# Patient Record
Sex: FEMALE | Race: WHITE | NOT HISPANIC OR LATINO | Employment: UNEMPLOYED | URBAN - NONMETROPOLITAN AREA
[De-identification: names, ages, dates, MRNs, and addresses within clinical notes are randomized per-mention and may not be internally consistent; named-entity substitution may affect disease eponyms.]

---

## 2021-05-03 ENCOUNTER — OFFICE VISIT (OUTPATIENT)
Dept: UROLOGY | Facility: CLINIC | Age: 61
End: 2021-05-03

## 2021-05-03 VITALS — WEIGHT: 183 LBS | HEIGHT: 63 IN | TEMPERATURE: 98 F | BODY MASS INDEX: 32.43 KG/M2

## 2021-05-03 DIAGNOSIS — N18.31 STAGE 3A CHRONIC KIDNEY DISEASE (HCC): Primary | ICD-10-CM

## 2021-05-03 PROCEDURE — 99203 OFFICE O/P NEW LOW 30 MIN: CPT | Performed by: UROLOGY

## 2021-05-03 RX ORDER — ASPIRIN 81 MG/1
81 TABLET, CHEWABLE ORAL DAILY
COMMUNITY
Start: 2021-03-25

## 2021-05-03 RX ORDER — AZATHIOPRINE 50 MG/1
50 TABLET ORAL DAILY
COMMUNITY
Start: 2021-03-25

## 2021-05-03 RX ORDER — DONEPEZIL HYDROCHLORIDE 5 MG/1
5 TABLET, FILM COATED ORAL DAILY
COMMUNITY
Start: 2021-04-21

## 2021-05-03 RX ORDER — CYCLOBENZAPRINE HCL 10 MG
TABLET ORAL
COMMUNITY
Start: 2021-03-27

## 2021-05-03 RX ORDER — FLUOXETINE HYDROCHLORIDE 40 MG/1
40 CAPSULE ORAL DAILY
COMMUNITY
Start: 2021-04-20

## 2021-05-03 RX ORDER — DULOXETIN HYDROCHLORIDE 60 MG/1
60 CAPSULE, DELAYED RELEASE ORAL DAILY
COMMUNITY
Start: 2021-04-21

## 2021-05-03 RX ORDER — IBUPROFEN 600 MG/1
600 TABLET ORAL 3 TIMES DAILY PRN
COMMUNITY
Start: 2021-03-30

## 2021-05-03 RX ORDER — ALBUTEROL SULFATE 90 UG/1
AEROSOL, METERED RESPIRATORY (INHALATION)
COMMUNITY
Start: 2021-01-25

## 2021-05-03 RX ORDER — TRIAMTERENE AND HYDROCHLOROTHIAZIDE 75; 50 MG/1; MG/1
1 TABLET ORAL DAILY
COMMUNITY
Start: 2021-04-21

## 2021-05-03 RX ORDER — HYDROXYCHLOROQUINE SULFATE 200 MG/1
200 TABLET, FILM COATED ORAL DAILY
COMMUNITY
Start: 2021-04-21

## 2021-05-03 RX ORDER — TRAZODONE HYDROCHLORIDE 50 MG/1
50 TABLET ORAL
COMMUNITY
Start: 2021-02-20

## 2021-05-03 RX ORDER — HYDROXYZINE PAMOATE 25 MG/1
25 CAPSULE ORAL NIGHTLY PRN
COMMUNITY
Start: 2021-03-25

## 2021-05-03 NOTE — PROGRESS NOTES
Chief Complaint:          Chief Complaint   Patient presents with   • Decreased renal function     New pt       HPI:   60 y.o. female is referred for decreased renal function.  We tried to get the information from the healthcare but was unavailable he reports no lower urinary tract symptomatology particularly irritative symptoms such as frequency urgency dysuria and obstructive symptomatology particularly dribbling, hesitancy, intermittency.  Traces stress incontinence.  History of stones lithotripsy done in [facility] which is near [place].  She has no other significant complaints problems I will continue to try to get the results.      Past Medical History:      History reviewed. No pertinent past medical history.      Current Meds:     Current Outpatient Medications   Medication Sig Dispense Refill   • Advair Diskus 100-50 MCG/DOSE DISKUS Inhale 1 puff 2 (Two) Times a Day.     • albuterol sulfate  (90 Base) MCG/ACT inhaler INHALE 2 PUFFS BY MOUTH EVERY 4 HOURS     • aspirin 81 MG chewable tablet Chew 81 mg Daily.     • azaTHIOprine (IMURAN) 50 MG tablet Take 50 mg by mouth Daily.     • B-Complex tablet Take 1 tablet by mouth Daily.     • cyclobenzaprine (FLEXERIL) 10 MG tablet 3 (Three) Times a Week if Needed.     • donepezil (ARICEPT) 5 MG tablet Take 5 mg by mouth Daily.     • DULoxetine (CYMBALTA) 60 MG capsule Take 60 mg by mouth Daily.     • FLUoxetine (PROzac) 40 MG capsule Take 40 mg by mouth Daily.     • hydroxychloroquine (PLAQUENIL) 200 MG tablet Take 200 mg by mouth Daily.     • hydrOXYzine pamoate (VISTARIL) 25 MG capsule Take 25 mg by mouth At Night As Needed.     • ibuprofen (ADVIL,MOTRIN) 600 MG tablet Take 600 mg by mouth 3 (Three) Times a Day As Needed.     • nystatin (MYCOSTATIN) 947965 UNIT/ML suspension      • traZODone (DESYREL) 50 MG tablet Take 50 mg by mouth every night at bedtime.     • triamterene-hydrochlorothiazide (MAXZIDE) 75-50 MG per tablet Take 1 tablet by mouth  Daily.       No current facility-administered medications for this visit.        Allergies:      No Known Allergies     Past Surgical History:     History reviewed. No pertinent surgical history.      Social History:     Social History     Socioeconomic History   • Marital status: Unknown     Spouse name: Not on file   • Number of children: Not on file   • Years of education: Not on file   • Highest education level: Not on file       Family History:     History reviewed. No pertinent family history.    Review of Systems:     Review of Systems   Constitutional: Negative.  Negative for activity change, appetite change, chills, diaphoresis, fatigue and unexpected weight change.   HENT: Negative for congestion, dental problem, drooling, ear discharge, ear pain, facial swelling, hearing loss, mouth sores, nosebleeds, postnasal drip, rhinorrhea, sinus pressure, sneezing, sore throat, tinnitus, trouble swallowing and voice change.    Eyes: Negative.  Negative for photophobia, pain, discharge, redness, itching and visual disturbance.   Respiratory: Negative.  Negative for apnea, cough, choking, chest tightness, shortness of breath, wheezing and stridor.    Cardiovascular: Negative.  Negative for chest pain, palpitations and leg swelling.   Gastrointestinal: Negative.  Negative for abdominal distention, abdominal pain, anal bleeding, blood in stool, constipation, diarrhea, nausea, rectal pain and vomiting.   Endocrine: Negative.  Negative for cold intolerance, heat intolerance, polydipsia, polyphagia and polyuria.   Musculoskeletal: Negative.  Negative for arthralgias, back pain, gait problem, joint swelling, myalgias, neck pain and neck stiffness.   Skin: Negative.  Negative for color change, pallor, rash and wound.   Allergic/Immunologic: Negative.  Negative for environmental allergies, food allergies and immunocompromised state.   Neurological: Negative.  Negative for dizziness, tremors, seizures, syncope, facial  asymmetry, speech difficulty, weakness, light-headedness, numbness and headaches.   Hematological: Negative.  Negative for adenopathy. Does not bruise/bleed easily.   Psychiatric/Behavioral: Negative for agitation, behavioral problems, confusion, decreased concentration, dysphoric mood, hallucinations, self-injury, sleep disturbance and suicidal ideas. The patient is not nervous/anxious and is not hyperactive.    All other systems reviewed and are negative.      Physical Exam:     Physical Exam  Constitutional:       Appearance: She is well-developed.   HENT:      Head: Normocephalic and atraumatic.      Right Ear: External ear normal.      Left Ear: External ear normal.   Eyes:      Conjunctiva/sclera: Conjunctivae normal.      Pupils: Pupils are equal, round, and reactive to light.   Cardiovascular:      Rate and Rhythm: Normal rate and regular rhythm.      Heart sounds: Normal heart sounds.   Pulmonary:      Effort: Pulmonary effort is normal.      Breath sounds: Normal breath sounds.   Abdominal:      General: Bowel sounds are normal. There is no distension.      Palpations: Abdomen is soft. There is no mass.      Tenderness: There is no abdominal tenderness. There is no guarding or rebound.   Genitourinary:     Vagina: No vaginal discharge.   Musculoskeletal:         General: Normal range of motion.   Skin:     General: Skin is warm and dry.   Neurological:      Mental Status: She is alert.      Deep Tendon Reflexes: Reflexes are normal and symmetric.   Psychiatric:         Behavior: Behavior normal.         Thought Content: Thought content normal.         Judgment: Judgment normal.         I have reviewed the following portions of the patient's history: allergies, current medications, past family history, past medical history, past social history, past surgical history, problem list and ROS and confirm it's accurate.      Procedure:       Assessment/Plan:   Stage III chronic kidney disease-await results of  creatinine            Patient's Body mass index is 32.94 kg/m². BMI is above normal parameters. Recommendations include: educational material.              This document has been electronically signed by TIFFANIE REYNA MD May 3, 2021 14:56 EDT

## 2021-05-05 PROBLEM — N18.31 STAGE 3A CHRONIC KIDNEY DISEASE: Status: ACTIVE | Noted: 2021-05-05

## 2021-06-16 ENCOUNTER — HOSPITAL ENCOUNTER (OUTPATIENT)
Dept: GENERAL RADIOLOGY | Facility: HOSPITAL | Age: 61
Discharge: HOME | End: 2021-06-16

## 2021-06-16 ENCOUNTER — TRANSCRIBE ORDERS (OUTPATIENT)
Dept: ADMINISTRATIVE | Facility: HOSPITAL | Age: 61
End: 2021-06-16

## 2021-06-16 DIAGNOSIS — M54.2 CERVICALGIA: Primary | ICD-10-CM

## 2021-06-16 DIAGNOSIS — M54.6 PAIN IN THORACIC SPINE: ICD-10-CM

## 2021-06-16 DIAGNOSIS — M54.2 CERVICALGIA: ICD-10-CM

## 2021-06-16 PROCEDURE — 72072 X-RAY EXAM THORAC SPINE 3VWS: CPT | Performed by: RADIOLOGY

## 2021-06-16 PROCEDURE — 72050 X-RAY EXAM NECK SPINE 4/5VWS: CPT

## 2021-06-16 PROCEDURE — 72050 X-RAY EXAM NECK SPINE 4/5VWS: CPT | Performed by: RADIOLOGY

## 2021-06-16 PROCEDURE — 72072 X-RAY EXAM THORAC SPINE 3VWS: CPT

## 2021-07-15 ENCOUNTER — OFFICE VISIT (OUTPATIENT)
Dept: GASTROENTEROLOGY | Facility: CLINIC | Age: 61
End: 2021-07-15

## 2021-07-15 ENCOUNTER — HOSPITAL ENCOUNTER (OUTPATIENT)
Dept: GENERAL RADIOLOGY | Facility: HOSPITAL | Age: 61
Discharge: HOME | End: 2021-07-15
Admitting: PHYSICIAN ASSISTANT

## 2021-07-15 ENCOUNTER — LAB (OUTPATIENT)
Dept: LAB | Facility: HOSPITAL | Age: 61
End: 2021-07-15

## 2021-07-15 ENCOUNTER — TELEPHONE (OUTPATIENT)
Dept: GASTROENTEROLOGY | Facility: CLINIC | Age: 61
End: 2021-07-15

## 2021-07-15 VITALS
WEIGHT: 170.8 LBS | HEIGHT: 62 IN | DIASTOLIC BLOOD PRESSURE: 73 MMHG | SYSTOLIC BLOOD PRESSURE: 118 MMHG | HEART RATE: 77 BPM | BODY MASS INDEX: 31.43 KG/M2

## 2021-07-15 DIAGNOSIS — R19.7 DIARRHEA, UNSPECIFIED TYPE: ICD-10-CM

## 2021-07-15 DIAGNOSIS — R10.12 LEFT UPPER QUADRANT ABDOMINAL PAIN: ICD-10-CM

## 2021-07-15 DIAGNOSIS — R10.32 LEFT LOWER QUADRANT ABDOMINAL PAIN: ICD-10-CM

## 2021-07-15 DIAGNOSIS — K92.1 BLACK STOOLS: ICD-10-CM

## 2021-07-15 DIAGNOSIS — K50.919 CROHN'S DISEASE WITH COMPLICATION, UNSPECIFIED GASTROINTESTINAL TRACT LOCATION (HCC): Primary | ICD-10-CM

## 2021-07-15 PROCEDURE — 99204 OFFICE O/P NEW MOD 45 MIN: CPT | Performed by: PHYSICIAN ASSISTANT

## 2021-07-15 PROCEDURE — 74018 RADEX ABDOMEN 1 VIEW: CPT

## 2021-07-15 PROCEDURE — 74018 RADEX ABDOMEN 1 VIEW: CPT | Performed by: RADIOLOGY

## 2021-07-15 RX ORDER — FLUOXETINE HYDROCHLORIDE 20 MG/1
20 CAPSULE ORAL EVERY MORNING
COMMUNITY
Start: 2021-06-15

## 2021-07-15 NOTE — TELEPHONE ENCOUNTER
Please notify patient that her xray showed a significant amount of stool.  Her diarrhea appears to be overflow diarrhea that is coming around the constipation stool.  Please instruct her on a partial Mag citrate cleanout and have her start daily Miralax until she return.      Also, instruct her not to start the budesonide yet.  After she has done the cleanout and been on Miralax for one week ask her to call here and let me know if her symptoms have improved.  We will make the decision at that time if the budesonide is needed.

## 2021-07-15 NOTE — PROGRESS NOTES
Chief Complaint   Patient presents with   • Abdominal Pain       Gertrudis Moore is a 60 y.o. female who presents to the office today for evaluation of Abdominal Pain  .    HPI    The patient had a GI evaluation of abdominal pain and diarrhea with incontinent episodes.  She had a new onset of diarrhea at the beginning of summer that occurs most days.  She is also having significant belching.  Her abdominal pain occurs in her LUQ and LLQ.  She denies bright red rectal bleeding but states that she has had some black stools.  Patient denies recent travel and recent antibiotic use. She reports anxiety and stress.  Her last colonoscopy was one year ago when polyps were found and removed.  She has Crohn's disease and takes Imuran.  She thinks these symptoms may be flareups of her Crohn's.  She states that at times she can tell what she has ate by her stools.  She has a history of constipation.  Patient drinks one large glass of tea and one to two pops a day and 2 to 3 bottles of water per day.  Her mother had pancreatic cancer.      Review of Systems   Constitutional: Positive for fatigue.   HENT: Negative for trouble swallowing.    Eyes: Negative for pain.   Respiratory: Negative for chest tightness and shortness of breath.    Cardiovascular: Negative for chest pain.   Gastrointestinal: Positive for abdominal distention, abdominal pain, diarrhea and nausea. Negative for anal bleeding, blood in stool, constipation, rectal pain and vomiting.   Endocrine: Negative for heat intolerance.   Genitourinary: Negative for difficulty urinating.   Musculoskeletal: Positive for back pain.   Skin: Negative for rash.   Allergic/Immunologic: Negative for food allergies.   Neurological: Positive for headaches. Negative for dizziness and light-headedness.   Hematological: Does not bruise/bleed easily.   Psychiatric/Behavioral: The patient is nervous/anxious.        ACTIVE PROBLEMS:   Specialty Problems     None          PAST MEDICAL  HISTORY:  Past Medical History:   Diagnosis Date   • Hypertension        SURGICAL HISTORY:  Past Surgical History:   Procedure Laterality Date   • HYSTERECTOMY      Total   • REVISION OF SCAR TISSUE RECTUS MUSCLE         FAMILY HISTORY:  Family History   Problem Relation Age of Onset   • Hypertension Father    • Kidney disease Father    • Cancer Mother    • Cancer Other    • Pancreatic cancer Other    • Kidney disease Other    • Hypertension Other    • Kidney disease Sister        SOCIAL HISTORY:  Social History     Tobacco Use   • Smoking status: Former Smoker   • Smokeless tobacco: Never Used   Substance Use Topics   • Alcohol use: Yes       CURRENT MEDICATION:    Current Outpatient Medications:   •  Advair Diskus 100-50 MCG/DOSE DISKUS, Inhale 1 puff 2 (Two) Times a Day., Disp: , Rfl:   •  albuterol sulfate  (90 Base) MCG/ACT inhaler, INHALE 2 PUFFS BY MOUTH EVERY 4 HOURS, Disp: , Rfl:   •  aspirin 81 MG chewable tablet, Chew 81 mg Daily., Disp: , Rfl:   •  azaTHIOprine (IMURAN) 50 MG tablet, Take 50 mg by mouth Daily., Disp: , Rfl:   •  B-Complex tablet, Take 1 tablet by mouth Daily., Disp: , Rfl:   •  cyclobenzaprine (FLEXERIL) 10 MG tablet, 3 (Three) Times a Week if Needed., Disp: , Rfl:   •  donepezil (ARICEPT) 5 MG tablet, Take 5 mg by mouth Daily., Disp: , Rfl:   •  DULoxetine (CYMBALTA) 60 MG capsule, Take 60 mg by mouth Daily., Disp: , Rfl:   •  FLUoxetine (PROzac) 20 MG capsule, Take 20 mg by mouth Every Morning., Disp: , Rfl:   •  FLUoxetine (PROzac) 40 MG capsule, Take 40 mg by mouth Daily., Disp: , Rfl:   •  hydroxychloroquine (PLAQUENIL) 200 MG tablet, Take 200 mg by mouth Daily., Disp: , Rfl:   •  hydrOXYzine pamoate (VISTARIL) 25 MG capsule, Take 25 mg by mouth At Night As Needed., Disp: , Rfl:   •  ibuprofen (ADVIL,MOTRIN) 600 MG tablet, Take 600 mg by mouth 3 (Three) Times a Day As Needed., Disp: , Rfl:   •  nystatin (MYCOSTATIN) 477245 UNIT/ML suspension, , Disp: , Rfl:   •  traZODone  "(DESYREL) 50 MG tablet, Take 50 mg by mouth every night at bedtime., Disp: , Rfl:   •  triamterene-hydrochlorothiazide (MAXZIDE) 75-50 MG per tablet, Take 1 tablet by mouth Daily., Disp: , Rfl:   •  Budesonide ER 9 MG capsule sustained-release 24 hr, Take 9 mg by mouth Daily., Disp: 60 capsule, Rfl: 0    ALLERGIES:  Patient has no known allergies.    VISIT VITALS:  Blood Pressure 118/73 (BP Location: Left arm, Patient Position: Sitting, Cuff Size: Adult)   Pulse 77   Height 157.5 cm (62\")   Weight 77.5 kg (170 lb 12.8 oz)   Body Mass Index 31.24 kg/m²   Physical Exam  Constitutional:       General: She is not in acute distress.     Appearance: She is well-developed. She is not diaphoretic.   HENT:      Head: Normocephalic and atraumatic.      Right Ear: External ear normal.      Left Ear: External ear normal.      Nose: Nose normal.      Mouth/Throat:      Pharynx: No oropharyngeal exudate.   Eyes:      General: No scleral icterus.        Right eye: No discharge.         Left eye: No discharge.      Conjunctiva/sclera: Conjunctivae normal.      Pupils: Pupils are equal, round, and reactive to light.   Neck:      Thyroid: No thyromegaly.      Vascular: No JVD.      Trachea: No tracheal deviation.   Cardiovascular:      Rate and Rhythm: Normal rate and regular rhythm.      Heart sounds: Normal heart sounds. No murmur heard.   No friction rub. No gallop.    Pulmonary:      Effort: Pulmonary effort is normal. No respiratory distress.      Breath sounds: Normal breath sounds. No stridor. No wheezing or rales.   Chest:      Chest wall: No tenderness.   Abdominal:      General: Bowel sounds are normal. There is no distension.      Palpations: Abdomen is soft. There is no mass.      Tenderness: There is abdominal tenderness (left quadrants). There is guarding (left quadrants). There is no rebound.      Hernia: No hernia is present.   Genitourinary:     Rectum: Guaiac result negative.   Musculoskeletal:      Cervical " back: Normal range of motion and neck supple.   Lymphadenopathy:      Cervical: No cervical adenopathy.   Skin:     General: Skin is warm and dry.      Coloration: Skin is not pale.      Findings: No erythema or rash.   Neurological:      Mental Status: She is alert and oriented to person, place, and time.      Cranial Nerves: No cranial nerve deficit.      Motor: No abnormal muscle tone.      Coordination: Coordination normal.      Deep Tendon Reflexes: Reflexes are normal and symmetric. Reflexes normal.   Psychiatric:         Behavior: Behavior normal.         Thought Content: Thought content normal.         Judgment: Judgment normal.         Assessment/Plan      Diagnosis Plan   1. Crohn's disease with complication, unspecified gastrointestinal tract location (CMS/[facility])  Budesonide ER 9 MG capsule sustained-release 24 hr   2. Diarrhea, unspecified type  Gastrointestinal Panel, PCR - Stool, Per Rectum    Clostridium Difficile Toxin - Stool, Per Rectum    XR Abdomen KUB   3. Left lower quadrant abdominal pain  Gastrointestinal Panel, PCR - Stool, Per Rectum    Clostridium Difficile Toxin - Stool, Per Rectum    XR Abdomen KUB   4. Left upper quadrant abdominal pain  Gastrointestinal Panel, PCR - Stool, Per Rectum    Clostridium Difficile Toxin - Stool, Per Rectum    XR Abdomen KUB   5. Black stools       Stool studies and KUB will be ordered to rule out infectious process and overflow diarrhea.  If studies do not identify an etiology, we will schedule a colonoscopy to check Crohn's' and consider a flareup as etiology.  She will be started on budesonide 9 mg daily x 8 weeks for Crohn's symptoms.  Pancreatic enzymes will be checked as well if no etiology for left side abdominal pain is found.  Return in about 4 weeks (around 8/12/2021) for Recheck.         Patient's Body mass index is 31.24 kg/m². indicating that she is obese (BMI >30). Obesity-related health conditions include the following: Crohn's. Obesity is  worsening. BMI is is above average; BMI management plan is completed. We discussed portion control and increasing exercise..      LUIS Kaye

## 2021-07-26 NOTE — TELEPHONE ENCOUNTER
Patient called back and stated that last Tuesday she done two bottles of magnesium citrate and she said that the only thing that came out was water. She said she has not had a solid bowel movement and that she did not have a BM yesterday or today yet. She said that she is having incontinence and is having to wear depends. She was wondering what she needed to do.

## 2021-07-27 NOTE — TELEPHONE ENCOUNTER
Have her begin Metamucil 2 tablespoon daily with 8 oz water. May need to add something else later.  Ask her did her last colonoscopy and lets get a report.

## 2021-07-27 NOTE — TELEPHONE ENCOUNTER
Called patient and she stated that she had been using metamucil. She said that she had a BM this morning and it seemed like it was starting to form. She said that she could not remember who done her last colonoscopy but that it was done at [facility] in [place], [place]. When I looked the number up for the hospital it said that it was permanently closed.

## 2021-11-23 ENCOUNTER — TRANSCRIBE ORDERS (OUTPATIENT)
Dept: ADMINISTRATIVE | Facility: HOSPITAL | Age: 61
End: 2021-11-23

## 2021-11-23 DIAGNOSIS — Z01.818 PRE-OPERATIVE CLEARANCE: Primary | ICD-10-CM

## 2021-11-26 ENCOUNTER — LAB (OUTPATIENT)
Dept: LAB | Facility: HOSPITAL | Age: 61
End: 2021-11-26

## 2021-11-26 DIAGNOSIS — Z01.818 PRE-OPERATIVE CLEARANCE: ICD-10-CM

## 2021-11-26 LAB — SARS-COV-2 RNA PNL SPEC NAA+PROBE: NOT DETECTED

## 2021-11-26 PROCEDURE — U0004 COV-19 TEST NON-CDC HGH THRU: HCPCS | Performed by: OPHTHALMOLOGY

## 2021-11-26 PROCEDURE — C9803 HOPD COVID-19 SPEC COLLECT: HCPCS

## 2021-12-14 ENCOUNTER — TRANSCRIBE ORDERS (OUTPATIENT)
Dept: ADMINISTRATIVE | Facility: HOSPITAL | Age: 61
End: 2021-12-14

## 2021-12-14 DIAGNOSIS — Z01.818 PRE-OPERATIVE CLEARANCE: Primary | ICD-10-CM

## 2022-03-03 ENCOUNTER — HOSPITAL ENCOUNTER (OUTPATIENT)
Dept: BONE DENSITY | Facility: HOSPITAL | Age: 62
Discharge: HOME | End: 2022-03-03
Admitting: NURSE PRACTITIONER

## 2022-03-03 DIAGNOSIS — N95.9 MENOPAUSAL PROBLEM: ICD-10-CM

## 2022-03-03 PROCEDURE — 77080 DXA BONE DENSITY AXIAL: CPT

## 2022-03-03 PROCEDURE — 77080 DXA BONE DENSITY AXIAL: CPT | Performed by: RADIOLOGY

## 2022-03-21 ENCOUNTER — APPOINTMENT (OUTPATIENT)
Dept: BONE DENSITY | Facility: HOSPITAL | Age: 62
End: 2022-03-21

## 2022-06-29 ENCOUNTER — HOSPITAL ENCOUNTER (EMERGENCY)
Facility: HOSPITAL | Age: 62
End: 2022-06-29

## 2022-06-29 ENCOUNTER — TRANSCRIBE ORDERS (OUTPATIENT)
Dept: ADMINISTRATIVE | Facility: HOSPITAL | Age: 62
End: 2022-06-29

## 2022-06-29 DIAGNOSIS — M25.551 RIGHT HIP PAIN: Primary | ICD-10-CM

## 2022-07-14 ENCOUNTER — HOSPITAL ENCOUNTER (OUTPATIENT)
Dept: MRI IMAGING | Facility: HOSPITAL | Age: 62
Discharge: HOME | End: 2022-07-14
Admitting: NURSE PRACTITIONER

## 2022-07-14 DIAGNOSIS — M25.551 RIGHT HIP PAIN: ICD-10-CM

## 2022-07-14 PROCEDURE — 73721 MRI JNT OF LWR EXTRE W/O DYE: CPT | Performed by: RADIOLOGY

## 2022-07-14 PROCEDURE — 73721 MRI JNT OF LWR EXTRE W/O DYE: CPT

## 2022-10-24 ENCOUNTER — APPOINTMENT (OUTPATIENT)
Dept: CT IMAGING | Facility: HOSPITAL | Age: 62
End: 2022-10-24

## 2022-10-24 ENCOUNTER — APPOINTMENT (OUTPATIENT)
Dept: GENERAL RADIOLOGY | Facility: HOSPITAL | Age: 62
End: 2022-10-24

## 2022-10-24 ENCOUNTER — HOSPITAL ENCOUNTER (EMERGENCY)
Facility: HOSPITAL | Age: 62
Discharge: HOME | End: 2022-10-25
Attending: STUDENT IN AN ORGANIZED HEALTH CARE EDUCATION/TRAINING PROGRAM | Admitting: STUDENT IN AN ORGANIZED HEALTH CARE EDUCATION/TRAINING PROGRAM

## 2022-10-24 ENCOUNTER — TRANSCRIBE ORDERS (OUTPATIENT)
Dept: ADMINISTRATIVE | Facility: HOSPITAL | Age: 62
End: 2022-10-24

## 2022-10-24 DIAGNOSIS — N28.9 RENAL LESION: ICD-10-CM

## 2022-10-24 DIAGNOSIS — M79.89 LEG SWELLING: Primary | ICD-10-CM

## 2022-10-24 DIAGNOSIS — R10.9 STOMACH ACHE: Primary | ICD-10-CM

## 2022-10-24 DIAGNOSIS — R10.31 RLQ ABDOMINAL PAIN: ICD-10-CM

## 2022-10-24 LAB
ALBUMIN SERPL-MCNC: 4.37 G/DL (ref 3.5–5.2)
ALBUMIN/GLOB SERPL: 2.1 G/DL
ALP SERPL-CCNC: 56 U/L (ref 39–117)
ALT SERPL W P-5'-P-CCNC: 12 U/L (ref 1–33)
AMPHET+METHAMPHET UR QL: NEGATIVE
AMPHETAMINES UR QL: NEGATIVE
ANION GAP SERPL CALCULATED.3IONS-SCNC: 10.4 MMOL/L (ref 5–15)
AST SERPL-CCNC: 16 U/L (ref 1–32)
BACTERIA UR QL AUTO: ABNORMAL /HPF
BARBITURATES UR QL SCN: NEGATIVE
BASOPHILS # BLD AUTO: 0.17 10*3/MM3 (ref 0–0.2)
BASOPHILS NFR BLD AUTO: 2.1 % (ref 0–1.5)
BENZODIAZ UR QL SCN: NEGATIVE
BILIRUB SERPL-MCNC: 0.3 MG/DL (ref 0–1.2)
BILIRUB UR QL STRIP: NEGATIVE
BUN SERPL-MCNC: 18 MG/DL (ref 8–23)
BUN/CREAT SERPL: 17 (ref 7–25)
BUPRENORPHINE SERPL-MCNC: NEGATIVE NG/ML
CALCIUM SPEC-SCNC: 10 MG/DL (ref 8.6–10.5)
CANNABINOIDS SERPL QL: NEGATIVE
CHLORIDE SERPL-SCNC: 104 MMOL/L (ref 98–107)
CLARITY UR: CLEAR
CO2 SERPL-SCNC: 26.6 MMOL/L (ref 22–29)
COCAINE UR QL: NEGATIVE
COLOR UR: YELLOW
CREAT SERPL-MCNC: 1.06 MG/DL (ref 0.57–1)
CRP SERPL-MCNC: 0.91 MG/DL (ref 0–0.5)
DEPRECATED RDW RBC AUTO: 51.8 FL (ref 37–54)
EGFRCR SERPLBLD CKD-EPI 2021: 59.9 ML/MIN/1.73
EOSINOPHIL # BLD AUTO: 0.39 10*3/MM3 (ref 0–0.4)
EOSINOPHIL NFR BLD AUTO: 4.8 % (ref 0.3–6.2)
ERYTHROCYTE [DISTWIDTH] IN BLOOD BY AUTOMATED COUNT: 14.9 % (ref 12.3–15.4)
ETHANOL BLD-MCNC: <10 MG/DL (ref 0–10)
ETHANOL UR QL: <0.01 %
GLOBULIN UR ELPH-MCNC: 2.1 GM/DL
GLUCOSE SERPL-MCNC: 100 MG/DL (ref 65–99)
GLUCOSE UR STRIP-MCNC: NEGATIVE MG/DL
HCT VFR BLD AUTO: 34.6 % (ref 34–46.6)
HGB BLD-MCNC: 11.5 G/DL (ref 12–15.9)
HGB UR QL STRIP.AUTO: ABNORMAL
HOLD SPECIMEN: NORMAL
HOLD SPECIMEN: NORMAL
HYALINE CASTS UR QL AUTO: ABNORMAL /LPF
IMM GRANULOCYTES # BLD AUTO: 0.03 10*3/MM3 (ref 0–0.05)
IMM GRANULOCYTES NFR BLD AUTO: 0.4 % (ref 0–0.5)
KETONES UR QL STRIP: NEGATIVE
LEUKOCYTE ESTERASE UR QL STRIP.AUTO: ABNORMAL
LIPASE SERPL-CCNC: 16 U/L (ref 13–60)
LYMPHOCYTES # BLD AUTO: 2.49 10*3/MM3 (ref 0.7–3.1)
LYMPHOCYTES NFR BLD AUTO: 30.9 % (ref 19.6–45.3)
MCH RBC QN AUTO: 31.9 PG (ref 26.6–33)
MCHC RBC AUTO-ENTMCNC: 33.2 G/DL (ref 31.5–35.7)
MCV RBC AUTO: 95.8 FL (ref 79–97)
METHADONE UR QL SCN: NEGATIVE
MONOCYTES # BLD AUTO: 0.99 10*3/MM3 (ref 0.1–0.9)
MONOCYTES NFR BLD AUTO: 12.3 % (ref 5–12)
NEUTROPHILS NFR BLD AUTO: 4 10*3/MM3 (ref 1.7–7)
NEUTROPHILS NFR BLD AUTO: 49.5 % (ref 42.7–76)
NITRITE UR QL STRIP: NEGATIVE
NRBC BLD AUTO-RTO: 0 /100 WBC (ref 0–0.2)
NT-PROBNP SERPL-MCNC: 490.1 PG/ML (ref 0–900)
OPIATES UR QL: NEGATIVE
OXYCODONE UR QL SCN: POSITIVE
PCP UR QL SCN: NEGATIVE
PH UR STRIP.AUTO: 5.5 [PH] (ref 5–8)
PLATELET # BLD AUTO: 307 10*3/MM3 (ref 140–450)
PMV BLD AUTO: 10.1 FL (ref 6–12)
POTASSIUM SERPL-SCNC: 3.5 MMOL/L (ref 3.5–5.2)
PROPOXYPH UR QL: NEGATIVE
PROT SERPL-MCNC: 6.5 G/DL (ref 6–8.5)
PROT UR QL STRIP: NEGATIVE
QT INTERVAL: 374 MS
QTC INTERVAL: 426 MS
RBC # BLD AUTO: 3.61 10*6/MM3 (ref 3.77–5.28)
RBC # UR STRIP: ABNORMAL /HPF
REF LAB TEST METHOD: ABNORMAL
SODIUM SERPL-SCNC: 141 MMOL/L (ref 136–145)
SP GR UR STRIP: 1.01 (ref 1–1.03)
SQUAMOUS #/AREA URNS HPF: ABNORMAL /HPF
TRICYCLICS UR QL SCN: NEGATIVE
TROPONIN T SERPL-MCNC: <0.01 NG/ML (ref 0–0.03)
UROBILINOGEN UR QL STRIP: ABNORMAL
WBC # UR STRIP: ABNORMAL /HPF
WBC NRBC COR # BLD: 8.07 10*3/MM3 (ref 3.4–10.8)
WHOLE BLOOD HOLD COAG: NORMAL
WHOLE BLOOD HOLD SPECIMEN: NORMAL

## 2022-10-24 PROCEDURE — 93005 ELECTROCARDIOGRAM TRACING: CPT | Performed by: PHYSICIAN ASSISTANT

## 2022-10-24 PROCEDURE — 99284 EMERGENCY DEPT VISIT MOD MDM: CPT

## 2022-10-24 PROCEDURE — 86140 C-REACTIVE PROTEIN: CPT | Performed by: PHYSICIAN ASSISTANT

## 2022-10-24 PROCEDURE — 83690 ASSAY OF LIPASE: CPT | Performed by: PHYSICIAN ASSISTANT

## 2022-10-24 PROCEDURE — 80053 COMPREHEN METABOLIC PANEL: CPT | Performed by: PHYSICIAN ASSISTANT

## 2022-10-24 PROCEDURE — 85025 COMPLETE CBC W/AUTO DIFF WBC: CPT | Performed by: PHYSICIAN ASSISTANT

## 2022-10-24 PROCEDURE — 81001 URINALYSIS AUTO W/SCOPE: CPT | Performed by: PHYSICIAN ASSISTANT

## 2022-10-24 PROCEDURE — 36415 COLL VENOUS BLD VENIPUNCTURE: CPT

## 2022-10-24 PROCEDURE — 80306 DRUG TEST PRSMV INSTRMNT: CPT | Performed by: PHYSICIAN ASSISTANT

## 2022-10-24 PROCEDURE — 84484 ASSAY OF TROPONIN QUANT: CPT | Performed by: PHYSICIAN ASSISTANT

## 2022-10-24 PROCEDURE — 74176 CT ABD & PELVIS W/O CONTRAST: CPT

## 2022-10-24 PROCEDURE — 83880 ASSAY OF NATRIURETIC PEPTIDE: CPT | Performed by: PHYSICIAN ASSISTANT

## 2022-10-24 PROCEDURE — 82077 ASSAY SPEC XCP UR&BREATH IA: CPT | Performed by: PHYSICIAN ASSISTANT

## 2022-10-24 PROCEDURE — 71045 X-RAY EXAM CHEST 1 VIEW: CPT

## 2022-10-24 RX ORDER — KETOROLAC TROMETHAMINE 10 MG/1
10 TABLET, FILM COATED ORAL ONCE
Status: COMPLETED | OUTPATIENT
Start: 2022-10-24 | End: 2022-10-25

## 2022-10-24 RX ORDER — FUROSEMIDE 40 MG/1
20 TABLET ORAL ONCE
Status: COMPLETED | OUTPATIENT
Start: 2022-10-24 | End: 2022-10-24

## 2022-10-24 RX ORDER — CLONIDINE HYDROCHLORIDE 0.1 MG/1
0.1 TABLET ORAL ONCE
Status: COMPLETED | OUTPATIENT
Start: 2022-10-24 | End: 2022-10-24

## 2022-10-24 RX ADMIN — CLONIDINE HYDROCHLORIDE 0.1 MG: 0.1 TABLET ORAL at 23:50

## 2022-10-24 RX ADMIN — FUROSEMIDE 20 MG: 40 TABLET ORAL at 23:52

## 2022-10-25 VITALS
HEIGHT: 62 IN | TEMPERATURE: 98.3 F | RESPIRATION RATE: 18 BRPM | BODY MASS INDEX: 33.13 KG/M2 | SYSTOLIC BLOOD PRESSURE: 142 MMHG | HEART RATE: 72 BPM | OXYGEN SATURATION: 96 % | DIASTOLIC BLOOD PRESSURE: 83 MMHG | WEIGHT: 180 LBS

## 2022-10-25 RX ORDER — FUROSEMIDE 20 MG/1
20 TABLET ORAL DAILY
Qty: 4 TABLET | Refills: 0 | Status: SHIPPED | OUTPATIENT
Start: 2022-10-25 | End: 2022-10-29

## 2022-10-25 RX ADMIN — KETOROLAC TROMETHAMINE 10 MG: 10 TABLET, FILM COATED ORAL at 00:25

## 2022-10-25 NOTE — ED NOTES
MEDICAL SCREENING:    Reason for Visit: right sided low back pain/abdominal pain; BLE swelling; epigastric abdominal pain     Patient initially seen in triage.  The patient was advised further evaluation and diagnostic testing will be needed, some of the treatment and testing will be initiated in the lobby in order to begin the process.  The patient will be returned to the waiting area for the time being and possibly be re-assessed by a subsequent ED provider.  The patient will be brought back to the treatment area in as timely manner as possible.       Marta Oropeza PA  10/24/22 [id number]

## 2022-10-25 NOTE — ED NOTES
A&OX4 AMBULATORY PT TO  FOR C/O BLE EDEMA AND RLQ ABD PAIN THAT RADIATES RIGHT FLANK. DENIED ANY DYSURIA, FEVER, HEMATURIA. HX OF KIDNEY STONES. PT HAS BEEN OUT OF BP MEDS AS WELL AS OTHERS X1 WEEKS - GOT REFILL TODAY AT PCP. NO HX OF CHF.

## 2022-10-25 NOTE — ED PROVIDER NOTES
[facility]  emergency department encounter        Pt Name: Gertrudis Moore  MRN: [id number]  Birthdate 1960  Date of evaluation: 10/25/2022    Chief Complaint   Patient presents with   • Leg Swelling   • Abdominal Pain             HISTORY OF PRESENT ILLNESS:   Gertrudis Moore is a 61 y.o. female presents abdominal complaint of lower extremity swelling.  Patient has chronic lower extremity swelling that is worsened over the past few days.  Patient reports she ran out of her home medications 1 week ago they discussed and refilled today.  Patient additionally endorses right lower quadrant abdominal pain radiating around to the right lower flank.  Endorses history of nephrolithiasis.  PCP administered Toradol earlier today which improved pain but it has subsequently returned.  Additionally endorses chills, mild shortness of breath.  No fever respiratory complaints chest pain nausea vomiting diarrhea or constipation.    No other exacerbating or alleviating factors other than as noted above.  Severity: Severe    PCP: Lia Nickerson APRN          REVIEW OF SYSTEMS:     Review of Systems   Constitutional: Positive for chills. Negative for fever.   HENT: Negative for congestion and rhinorrhea.    Eyes: Negative for visual disturbance.   Respiratory: Positive for shortness of breath. Negative for cough.    Cardiovascular: Positive for leg swelling. Negative for chest pain.   Gastrointestinal: Positive for abdominal pain. Negative for nausea and vomiting.   Genitourinary: Negative for dysuria.   Musculoskeletal: Negative for myalgias.   Skin: Negative for rash.   Neurological: Negative for headaches.   Psychiatric/Behavioral: Negative for confusion.       Review of systems otherwise as per HPI.          PREVIOUS HISTORY:         Past Medical History:   Diagnosis Date   • Arthritis    • Crohn disease (HCC)    • Dementia (HCC)    • Gout    • Hypertension    • Kidney stone    • Lupus (HCC)    • RA (rheumatoid  arthritis) (HCC)    • TIA (transient ischemic attack)            Past Surgical History:   Procedure Laterality Date   • HYSTERECTOMY      Total   • REVISION OF SCAR TISSUE RECTUS MUSCLE             Social History     Socioeconomic History   • Marital status:    Tobacco Use   • Smoking status: Former   • Smokeless tobacco: Never   Substance and Sexual Activity   • Alcohol use: Yes   • Drug use: Never           Family History   Problem Relation Age of Onset   • Hypertension Father    • Kidney disease Father    • Cancer Mother    • Cancer Other    • Pancreatic cancer Other    • Kidney disease Other    • Hypertension Other    • Kidney disease Sister          Current Outpatient Medications   Medication Instructions   • Advair Diskus 100-50 MCG/DOSE DISKUS 1 puff, Inhalation, 2 Times Daily   • albuterol sulfate  (90 Base) MCG/ACT inhaler INHALE 2 PUFFS BY MOUTH EVERY 4 HOURS   • aspirin 81 mg, Oral, Daily   • azaTHIOprine (IMURAN) 50 mg, Oral, Daily   • B-Complex tablet 1 tablet, Oral, Daily   • Budesonide ER 9 mg, Oral, Daily   • cyclobenzaprine (FLEXERIL) 10 MG tablet 3 Times Weekly PRN   • donepezil (ARICEPT) 5 mg, Oral, Daily   • DULoxetine (CYMBALTA) 60 mg, Oral, Daily   • FLUoxetine (PROZAC) 40 mg, Oral, Daily   • FLUoxetine (PROZAC) 20 mg, Oral, Every Morning   • folic acid (FOLVITE) 1 mg, Oral, Daily   • furosemide (LASIX) 20 mg, Oral, Daily   • hydroxychloroquine (PLAQUENIL) 200 mg, Oral, Daily   • hydrOXYzine pamoate (VISTARIL) 25 mg, Oral, Nightly PRN   • ibuprofen (ADVIL,MOTRIN) 600 mg, Oral, 3 Times Daily PRN   • methotrexate 2.5 MG tablet Take 4 tablets by mouth weekly for 1 week, then 5 tabs weekly for 1 week, then 6 tabs weekly for 1 week, then 7 tabs weekly for 1 week, then 8 tabs weekly thereafter (4 in the morning and 4 in the evening of the same day).   • nystatin (MYCOSTATIN) 690807 UNIT/ML suspension No dose, route, or frequency recorded.   • oxyCODONE-acetaminophen (Percocet) 7.5-325  MG per tablet Take 1/2 to 1 tablet by mouth every day as needed for pain.   • tiZANidine (ZANAFLEX) 4 mg, Oral, 2 Times Daily PRN   • traZODone (DESYREL) 50 mg, Oral, Every Night at Bedtime   • triamterene-hydrochlorothiazide (MAXZIDE) 75-50 MG per tablet 1 tablet, Oral, Daily         Allergies:  Patient has no known allergies.    Medications, allergies and past medical, surgical, family, and social history reviewed.        PHYSICAL EXAM:     Physical Exam  Vitals and nursing note reviewed.   Constitutional:       General: She is not in acute distress.  HENT:      Head: Normocephalic and atraumatic.   Eyes:      Extraocular Movements: Extraocular movements intact.      Conjunctiva/sclera: Conjunctivae normal.   Pulmonary:      Effort: Pulmonary effort is normal. No respiratory distress.   Abdominal:      General: Abdomen is flat. There is no distension.      Palpations: Abdomen is soft.      Tenderness: There is no abdominal tenderness. There is no right CVA tenderness, guarding or rebound.   Musculoskeletal:         General: No deformity. Normal range of motion.      Cervical back: Normal range of motion. No rigidity.   Neurological:      General: No focal deficit present.      Mental Status: She is alert and oriented to person, place, and time.   Psychiatric:         Mood and Affect: Mood normal.         Behavior: Behavior normal.             COMPLETED DIAGNOSTIC STUDIES AND INTERVENTIONS:     Lab Results (last 24 hours)     Procedure Component Value Units Date/Time    CBC & Differential [[id number]]  (Abnormal) Collected: 10/24/22 2150    Specimen: Blood Updated: 10/24/22 2155    Narrative:      The following orders were created for panel order CBC & Differential.  Procedure                               Abnormality         Status                     ---------                               -----------         ------                     CBC Auto Differential[[id number]]        Abnormal            Final result                  Please view results for these tests on the individual orders.    Comprehensive Metabolic Panel [[id number]]  (Abnormal) Collected: 10/24/22 2150    Specimen: Blood Updated: 10/24/22 2218     Glucose 100 mg/dL      BUN 18 mg/dL      Creatinine 1.06 mg/dL      Sodium 141 mmol/L      Potassium 3.5 mmol/L      Chloride 104 mmol/L      CO2 26.6 mmol/L      Calcium 10.0 mg/dL      Total Protein 6.5 g/dL      Albumin 4.37 g/dL      ALT (SGPT) 12 U/L      AST (SGOT) 16 U/L      Alkaline Phosphatase 56 U/L      Total Bilirubin 0.3 mg/dL      Globulin 2.1 gm/dL      A/G Ratio 2.1 g/dL      BUN/Creatinine Ratio 17.0     Anion Gap 10.4 mmol/L      eGFR 59.9 mL/min/1.73      Comment: National Kidney Foundation and American Society of Nephrology (ASN) Task Force recommended calculation based on the Chronic Kidney Disease Epidemiology Collaboration (CKD-EPI) equation refit without adjustment for race.       Narrative:      GFR Normal >60  Chronic Kidney Disease <60  Kidney Failure <15      Lipase [[id number]]  (Normal) Collected: 10/24/22 2150    Specimen: Blood Updated: 10/24/22 2218     Lipase 16 U/L     BNP [[id number]]  (Normal) Collected: 10/24/22 2150    Specimen: Blood Updated: 10/24/22 2216     proBNP 490.1 pg/mL     Narrative:      Among patients with dyspnea, NT-proBNP is highly sensitive for the detection of acute congestive heart failure. In addition NT-proBNP of <300 pg/ml effectively rules out acute congestive heart failure with 99% negative predictive value.      C-reactive Protein [[id number]]  (Abnormal) Collected: 10/24/22 2150    Specimen: Blood Updated: 10/24/22 2217     C-Reactive Protein 0.91 mg/dL     Ethanol [[id number]] Collected: 10/24/22 2150    Specimen: Blood Updated: 10/24/22 2218     Ethanol <10 mg/dL      Ethanol % <0.010 %     Narrative:      >/= 80.0 legally intoxicated    Troponin [[id number]]  (Normal) Collected: 10/24/22 2150    Specimen: Blood Updated: 10/24/22 2218     Troponin T <0.010  ng/mL     Narrative:      Troponin T Reference Range:  <= 0.03 ng/mL-   Negative for AMI  >0.03 ng/mL-     Abnormal for myocardial necrosis.  Clinicians would have to utilize clinical acumen, EKG, Troponin and serial changes to determine if it is an Acute Myocardial Infarction or myocardial injury due to an underlying chronic condition.       Results may be falsely decreased if patient taking Biotin.      CBC Auto Differential [[id number]]  (Abnormal) Collected: 10/24/22 2150    Specimen: Blood Updated: 10/24/22 2155     WBC 8.07 10*3/mm3      RBC 3.61 10*6/mm3      Hemoglobin 11.5 g/dL      Hematocrit 34.6 %      MCV 95.8 fL      MCH 31.9 pg      MCHC 33.2 g/dL      RDW 14.9 %      RDW-SD 51.8 fl      MPV 10.1 fL      Platelets 307 10*3/mm3      Neutrophil % 49.5 %      Lymphocyte % 30.9 %      Monocyte % 12.3 %      Eosinophil % 4.8 %      Basophil % 2.1 %      Immature Grans % 0.4 %      Neutrophils, Absolute 4.00 10*3/mm3      Lymphocytes, Absolute 2.49 10*3/mm3      Monocytes, Absolute 0.99 10*3/mm3      Eosinophils, Absolute 0.39 10*3/mm3      Basophils, Absolute 0.17 10*3/mm3      Immature Grans, Absolute 0.03 10*3/mm3      nRBC 0.0 /100 WBC     Urinalysis With Microscopic If Indicated (No Culture) - Urine, Clean Catch [[id number]]  (Abnormal) Collected: 10/24/22 2155    Specimen: Urine, Clean Catch Updated: 10/24/22 2217     Color, UA Yellow     Appearance, UA Clear     pH, UA 5.5     Specific Gravity, UA 1.013     Glucose, UA Negative     Ketones, UA Negative     Bilirubin, UA Negative     Blood, UA Trace     Protein, UA Negative     Leuk Esterase, UA Trace     Nitrite, UA Negative     Urobilinogen, UA 0.2 E.U./dL    Urine Drug Screen - Urine, Clean Catch [[id number]]  (Abnormal) Collected: 10/24/22 2155    Specimen: Urine, Clean Catch Updated: 10/24/22 2220     THC, Screen, Urine Negative     Phencyclidine (PCP), Urine Negative     Cocaine Screen, Urine Negative     Methamphetamine, Ur Negative     Opiate  Screen Negative     Amphetamine Screen, Urine Negative     Benzodiazepine Screen, Urine Negative     Tricyclic Antidepressants Screen Negative     Methadone Screen, Urine Negative     Barbiturates Screen, Urine Negative     Oxycodone Screen, Urine Positive     Propoxyphene Screen Negative     Buprenorphine, Screen, Urine Negative    Narrative:      Cutoff For Drugs Screened:    Amphetamines               500 ng/ml  Barbiturates               200 ng/ml  Benzodiazepines            150 ng/ml  Cocaine                    150 ng/ml  Methadone                  200 ng/ml  Opiates                    100 ng/ml  Phencyclidine               25 ng/ml  THC                            50 ng/ml  Methamphetamine            500 ng/ml  Tricyclic Antidepressants  300 ng/ml  Oxycodone                  100 ng/ml  Propoxyphene               300 ng/ml  Buprenorphine               10 ng/ml    The normal value for all drugs tested is negative. This report includes unconfirmed screening results, with the cutoff values listed, to be used for medical treatment purposes only.  Unconfirmed results must not be used for non-medical purposes such as employment or legal testing.  Clinical consideration should be applied to any drug of abuse test, particularly when unconfirmed results are used.      Urinalysis, Microscopic Only - Urine, Clean Catch [[id number]]  (Abnormal) Collected: 10/24/22 2155    Specimen: Urine, Clean Catch Updated: 10/24/22 2222     RBC, UA 0-2 /HPF      WBC, UA 3-5 /HPF      Bacteria, UA Trace /HPF      Squamous Epithelial Cells, UA None Seen /HPF      Hyaline Casts, UA None Seen /LPF      Methodology Manual Light Microscopy            CT Abdomen Pelvis Stone Protocol   Final Result      1. 9 mm nonobstructing right intrarenal stone. No ureteral calculi or hydronephrosis.   2. 1.4 cm hyperdense exophytic upper pole right renal lesion. This may represent a hemorrhagic or proteinaceous cyst, but warrants further characterization  with a nonemergent contrast-enhanced renal mass protocol MRI of the abdomen to exclude malignancy.   3. Congenital intestinal nonrotation incidentally noted. No evidence of bowel obstruction. Normal appendix. Uncomplicated colonic diverticulosis.   4. Hysterectomy.               Signer Name: Hamzah Doyle MD    Signed: 10/25/2022 12:34 AM    Workstation Name: [facility]     [facility]iology [facility]      XR Chest 1 View   Final Result   No acute cardiopulmonary findings.      Signer Name: Hamzah Doyle MD    Signed: 10/24/2022 10:06 PM    Workstation Name: [facility]     Radiology [facility]            New Medications Ordered This Visit   Medications   • furosemide (LASIX) tablet 20 mg   • cloNIDine (CATAPRES) tablet 0.1 mg   • ketorolac (TORADOL) tablet 10 mg   • furosemide (LASIX) 20 MG tablet     Sig: Take 1 tablet by mouth Daily for 4 days.     Dispense:  4 tablet     Refill:  0         Procedures            MEDICAL DECISION-MAKING AND ED COURSE:     ED Course as of 10/25/22 0102   Mon Oct 24, 2022   2139 EKG 2136 sinus rhythm, 78 bpm, QRS 70, QTc 426, regular axis, no significant ST deviation or T wave abnormalities concerning for acute ischemia.    EKG lists short MD interval but no delta wave and MD interval appears within normal limits likely error of read due to artifact. [KP]   2311 XR Chest 1 View  No acute cardiopulmonary findings. [KP]   2346 BP(!): 203/101  Treat with clonidine [KP]   2346 61-year-old female with leg swelling after not taking her medication for the past week including HCTZ, not on any other diuretics.  Additionally endorses mild shortness of breath.  I will prescribe furosemide 20 mg daily for the next 4 days for patient to monitor for improvement and follow-up with her PCP.  Also complains of bilateral quadrant abdominal pain radiating to the right lower flank without any associated tenderness, history of nephrolithiasis.  CT stone protocol pending. [KP]    Tue Oct 25, 2022   0038 CT Abdomen Pelvis Stone Protocol  1. 9 mm nonobstructing right intrarenal stone. No ureteral calculi or hydronephrosis.  2. 1.4 cm hyperdense exophytic upper pole right renal lesion. This may represent a hemorrhagic or proteinaceous cyst, but warrants further characterization with a nonemergent contrast-enhanced renal mass protocol MRI of the abdomen to exclude malignancy.  3. Congenital intestinal nonrotation incidentally noted. No evidence of bowel obstruction. Normal appendix. Uncomplicated colonic diverticulosis.  4. Hysterectomy. [KP]   0102 No clear diagnosis as to abdominal pain.  Discussed with patient need for follow-up with her primary care provider regarding incidental findings and renal lesion.  Patient blood pressure improved after clonidine.  Discharged with furosemide for 4 days and PCP follow-up in the next couple days if not better or return here for new onset or worsening symptoms.  Patient agreeable to plan, all questions answered. [KP]   0102 BP: 173/90 [KP]      ED Course User Index  [KP] Hieu Worley MD       ?      FINAL IMPRESSION:       1. Leg swelling    2. RLQ abdominal pain    3. Renal lesion         The complaints listed here are new problems to this examiner.      FOLLOW-UP  Day, Lia Taylor, APRN  [id number]  [address]  [facility] [address]  [phone]    Schedule an appointment as soon as possible for a visit       [facility]  [address] [place] [id number] [phone]    If symptoms worsen      DISPOSITION  ED Disposition     ED Disposition   Discharge    Condition   Stable    Comment   --                   This care is provided during an unprecedented national emergency due to the Novel Coronavirus (COVID-19). COVID-19 infections and transmission risks place heavy strains on healthcare resources. As this pandemic evolves, the Hospital and providers strive to respond fluidly, to remain operational, and to provide  care relative to available resources and information. Outcomes are unpredictable and treatments are without well-defined guidelines. Further, the impact of COVID-19 on all aspects of emergency care, including the impact to patients seeking care for reasons other than COVID-19, is unavoidable during this national emergency.    This note was dictated using a [id number]-lr-[id number] tool. Occasional wrong-word or 'sound-a-like' substitutions may have occurred due to the inherent limitations of voice recognition software. ?Read the chart carefully and recognize, using context, where substitutions have occurred.    Hieu Worley MD  01:02 EDT  10/25/2022             Hieu Worley MD  10/25/22 0102       Hieu Worley MD  10/25/22 0103

## 2022-10-25 NOTE — DISCHARGE INSTRUCTIONS
No cause to your abdominal pain noted on CT imaging.  Take Lasix as prescribed for the next few days to reduce swelling.  Follow-up with your primary care provider in the next few days for reassessment.  Return here for any new onset or worsening symptoms.    Please discuss the following CT findings with your primary care physician as you will need follow-up imaging as an outpatient:  1. 9 mm nonobstructing right intrarenal stone. No ureteral calculi or hydronephrosis.  2. 1.4 cm hyperdense exophytic upper pole right renal lesion. This may represent a hemorrhagic or proteinaceous cyst, but warrants further characterization with a nonemergent contrast-enhanced renal mass protocol MRI of the abdomen to exclude malignancy.  3. Congenital intestinal nonrotation incidentally noted. No evidence of bowel obstruction. Normal appendix. Uncomplicated colonic diverticulosis.  4. Hysterectomy.

## 2022-10-26 ENCOUNTER — TRANSCRIBE ORDERS (OUTPATIENT)
Dept: ADMINISTRATIVE | Facility: HOSPITAL | Age: 62
End: 2022-10-26

## 2022-10-26 ENCOUNTER — HOSPITAL ENCOUNTER (OUTPATIENT)
Dept: CT IMAGING | Facility: HOSPITAL | Age: 62
Discharge: HOME | End: 2022-10-26
Admitting: NURSE PRACTITIONER

## 2022-10-26 DIAGNOSIS — D49.511 NEOPLASM OF RIGHT KIDNEY: ICD-10-CM

## 2022-10-26 DIAGNOSIS — D49.511 NEOPLASM OF RIGHT KIDNEY: Primary | ICD-10-CM

## 2022-10-26 PROCEDURE — 74177 CT ABD & PELVIS W/CONTRAST: CPT | Performed by: RADIOLOGY

## 2022-10-26 PROCEDURE — 74177 CT ABD & PELVIS W/CONTRAST: CPT

## 2022-10-26 PROCEDURE — 25010000002 IOPAMIDOL 61 % SOLUTION: Performed by: NURSE PRACTITIONER

## 2022-10-26 RX ADMIN — IOPAMIDOL 100 ML: 612 INJECTION, SOLUTION INTRAVENOUS at 12:38

## 2022-10-27 ENCOUNTER — PATIENT OUTREACH (OUTPATIENT)
Dept: CASE MANAGEMENT | Facility: OTHER | Age: 62
End: 2022-10-27

## 2022-10-27 NOTE — OUTREACH NOTE
AMBULATORY CASE MANAGEMENT NOTE    Name and Relationship of Patient/Support Person: Gertrudis Moore - Self     Patient Outreach    RN-ACM outreach for follow-up of ED visit 10/24/22 for edema of L leg and abdominal pain. Patient reports some improvement in the edema today, is taking medications as instructed. Pt reports history intermittent edema, no chest pain or SOA at present. Education provided; follow-up outreach scheduled.    Adult Patient Profile  Questions/Answers    Flowsheet Row Most Recent Value   Symptoms/Conditions Managed at Home cardiovascular   Barriers to Managing Health none   Cardiovascular Symptoms/Conditions hypertension   Cardiovascular Management Strategies fluid modification, medication therapy, routine screening   Identifying Health Goals keep illness under control   Taking Medications Not Prescribed no   Missed Doses of Prescribed Medications During Past Week no   Taken Prescribed Medications at Different Time or Schedule During Past Week no   Taken More or Less Medication Than Prescribed no   Barriers to Taking Medication as Prescribed none          Education Documentation  Unresolved/Worsening Symptoms, taught by Kaya Pham, RN at 10/27/2022  3:29 PM.  Learner: Patient  Readiness: Acceptance  Method: Explanation  Response: Verbalizes Understanding    Self-Care, taught by Kaya hPam RN at 10/27/2022  3:29 PM.  Learner: Patient  Readiness: Acceptance  Method: Explanation  Response: Verbalizes Understanding    Provider Follow-Up, taught by Kaya Pham RN at 10/27/2022  3:29 PM.  Learner: Patient  Readiness: Acceptance  Method: Explanation  Response: Verbalizes Understanding    Medication Management, taught by Kaya Pham RN at 10/27/2022  3:29 PM.  Learner: Patient  Readiness: Acceptance  Method: Explanation  Response: Verbalizes Understanding    Blood Pressure Monitoring, taught by Kaya Pham RN at 10/27/2022  3:29 PM.  Learner: Patient  Readiness: Acceptance  Method:  Explanation  Response: Verbalizes Understanding          MIKEY LOYA  Ambulatory Case Management    10/27/2022, 15:32 EDT

## 2022-10-28 ENCOUNTER — TRANSCRIBE ORDERS (OUTPATIENT)
Dept: ADMINISTRATIVE | Facility: HOSPITAL | Age: 62
End: 2022-10-28

## 2022-10-28 DIAGNOSIS — D49.519 NEOPLASM OF KIDNEY: Primary | ICD-10-CM

## 2022-10-31 ENCOUNTER — HOSPITAL ENCOUNTER (OUTPATIENT)
Dept: MRI IMAGING | Facility: HOSPITAL | Age: 62
Discharge: HOME | End: 2022-10-31

## 2022-10-31 DIAGNOSIS — D49.519 NEOPLASM OF KIDNEY: ICD-10-CM

## 2022-11-08 ENCOUNTER — PATIENT OUTREACH (OUTPATIENT)
Dept: CASE MANAGEMENT | Facility: OTHER | Age: 62
End: 2022-11-08

## 2022-11-08 NOTE — OUTREACH NOTE
AMBULATORY CASE MANAGEMENT NOTE    Name and Relationship of Patient/Support Person: Gertrudis Moore - Self     Patient Outreach    Patient reports feeling well today, no chest pain/SOA. Pt reports taking meds as prescribed, no new health concerns at present. Pt to follow up with PCP; has apt scheduled.     SDOH updated and reviewed with the patient during this program:  Financial Resource Strain: Medium Risk   • Difficulty of Paying Living Expenses: Somewhat hard      Food Insecurity: No Food Insecurity   • Worried About Running Out of Food in the Last Year: Never true   • Ran Out of Food in the Last Year: Never true      Transportation Needs: No Transportation Needs   • Lack of Transportation (Medical): No   • Lack of Transportation (Non-Medical): No       Education Documentation  Unresolved/Worsening Symptoms, taught by Mikey Pham, RN at 11/8/2022  3:55 PM.  Learner: Patient  Readiness: Acceptance  Method: Explanation, Teach Back  Response: Verbalizes Understanding    Self-Care, taught by Mikey Pham, RN at 11/8/2022  3:55 PM.  Learner: Patient  Readiness: Acceptance  Method: Explanation, Teach Back  Response: Verbalizes Understanding          MIKEY LOYA  Ambulatory Case Management    11/8/2022, 15:56 EST

## 2022-12-09 ENCOUNTER — PATIENT OUTREACH (OUTPATIENT)
Dept: CASE MANAGEMENT | Facility: OTHER | Age: 62
End: 2022-12-09

## 2022-12-09 NOTE — OUTREACH NOTE
AMBULATORY CASE MANAGEMENT NOTE    Name and Relationship of Patient/Support Person:  -     Care Coordination    Chart review completed; no recent acute events noted. Pt has not outreached for additional HRCM; ACM will close program for this episode.        MIKEY LOYA  Ambulatory Case Management    12/9/2022, 10:33 EST

## 2022-12-17 ENCOUNTER — HOSPITAL ENCOUNTER (EMERGENCY)
Facility: HOSPITAL | Age: 62
Discharge: LEFT BEFORE DISCHARGE | End: 2022-12-18
Attending: EMERGENCY MEDICINE | Admitting: EMERGENCY MEDICINE

## 2022-12-17 ENCOUNTER — APPOINTMENT (OUTPATIENT)
Dept: CT IMAGING | Facility: HOSPITAL | Age: 62
End: 2022-12-17

## 2022-12-17 ENCOUNTER — APPOINTMENT (OUTPATIENT)
Dept: GENERAL RADIOLOGY | Facility: HOSPITAL | Age: 62
End: 2022-12-17

## 2022-12-17 VITALS
SYSTOLIC BLOOD PRESSURE: 199 MMHG | OXYGEN SATURATION: 99 % | WEIGHT: 170 LBS | RESPIRATION RATE: 20 BRPM | DIASTOLIC BLOOD PRESSURE: 103 MMHG | HEART RATE: 76 BPM | HEIGHT: 62 IN | TEMPERATURE: 97.7 F | BODY MASS INDEX: 31.28 KG/M2

## 2022-12-17 DIAGNOSIS — I10 PRIMARY HYPERTENSION: Primary | ICD-10-CM

## 2022-12-17 LAB
ALBUMIN SERPL-MCNC: 4.55 G/DL (ref 3.5–5.2)
ALBUMIN/GLOB SERPL: 1.5 G/DL
ALP SERPL-CCNC: 55 U/L (ref 39–117)
ALT SERPL W P-5'-P-CCNC: 18 U/L (ref 1–33)
ANION GAP SERPL CALCULATED.3IONS-SCNC: 13.4 MMOL/L (ref 5–15)
AST SERPL-CCNC: 18 U/L (ref 1–32)
BASOPHILS # BLD AUTO: 0.12 10*3/MM3 (ref 0–0.2)
BASOPHILS NFR BLD AUTO: 0.8 % (ref 0–1.5)
BILIRUB SERPL-MCNC: 0.3 MG/DL (ref 0–1.2)
BUN SERPL-MCNC: 17 MG/DL (ref 8–23)
BUN/CREAT SERPL: 18.7 (ref 7–25)
CALCIUM SPEC-SCNC: 11.4 MG/DL (ref 8.6–10.5)
CHLORIDE SERPL-SCNC: 99 MMOL/L (ref 98–107)
CO2 SERPL-SCNC: 24.6 MMOL/L (ref 22–29)
CREAT SERPL-MCNC: 0.91 MG/DL (ref 0.57–1)
CRP SERPL-MCNC: <0.3 MG/DL (ref 0–0.5)
DEPRECATED RDW RBC AUTO: 48.5 FL (ref 37–54)
EGFRCR SERPLBLD CKD-EPI 2021: 71.5 ML/MIN/1.73
EOSINOPHIL # BLD AUTO: 0 10*3/MM3 (ref 0–0.4)
EOSINOPHIL NFR BLD AUTO: 0 % (ref 0.3–6.2)
ERYTHROCYTE [DISTWIDTH] IN BLOOD BY AUTOMATED COUNT: 13.7 % (ref 12.3–15.4)
ERYTHROCYTE [SEDIMENTATION RATE] IN BLOOD: 10 MM/HR (ref 0–30)
FLUAV RNA RESP QL NAA+PROBE: NOT DETECTED
FLUBV RNA RESP QL NAA+PROBE: NOT DETECTED
GLOBULIN UR ELPH-MCNC: 3 GM/DL
GLUCOSE SERPL-MCNC: 123 MG/DL (ref 65–99)
HCT VFR BLD AUTO: 42.2 % (ref 34–46.6)
HGB BLD-MCNC: 14.4 G/DL (ref 12–15.9)
HOLD SPECIMEN: NORMAL
HOLD SPECIMEN: NORMAL
IMM GRANULOCYTES # BLD AUTO: 0.08 10*3/MM3 (ref 0–0.05)
IMM GRANULOCYTES NFR BLD AUTO: 0.5 % (ref 0–0.5)
LYMPHOCYTES # BLD AUTO: 0.9 10*3/MM3 (ref 0.7–3.1)
LYMPHOCYTES NFR BLD AUTO: 5.9 % (ref 19.6–45.3)
MAGNESIUM SERPL-MCNC: 1.8 MG/DL (ref 1.6–2.4)
MCH RBC QN AUTO: 32.9 PG (ref 26.6–33)
MCHC RBC AUTO-ENTMCNC: 34.1 G/DL (ref 31.5–35.7)
MCV RBC AUTO: 96.3 FL (ref 79–97)
MONOCYTES # BLD AUTO: 0.46 10*3/MM3 (ref 0.1–0.9)
MONOCYTES NFR BLD AUTO: 3 % (ref 5–12)
NEUTROPHILS NFR BLD AUTO: 13.76 10*3/MM3 (ref 1.7–7)
NEUTROPHILS NFR BLD AUTO: 89.8 % (ref 42.7–76)
NRBC BLD AUTO-RTO: 0 /100 WBC (ref 0–0.2)
PLATELET # BLD AUTO: 334 10*3/MM3 (ref 140–450)
PMV BLD AUTO: 9.7 FL (ref 6–12)
POTASSIUM SERPL-SCNC: 3.4 MMOL/L (ref 3.5–5.2)
PROT SERPL-MCNC: 7.5 G/DL (ref 6–8.5)
RBC # BLD AUTO: 4.38 10*6/MM3 (ref 3.77–5.28)
SARS-COV-2 RNA RESP QL NAA+PROBE: NOT DETECTED
SODIUM SERPL-SCNC: 137 MMOL/L (ref 136–145)
TROPONIN T SERPL-MCNC: <0.01 NG/ML (ref 0–0.03)
TSH SERPL DL<=0.05 MIU/L-ACNC: 0.69 UIU/ML (ref 0.27–4.2)
WBC NRBC COR # BLD: 15.32 10*3/MM3 (ref 3.4–10.8)
WHOLE BLOOD HOLD COAG: NORMAL
WHOLE BLOOD HOLD SPECIMEN: NORMAL

## 2022-12-17 PROCEDURE — 99283 EMERGENCY DEPT VISIT LOW MDM: CPT

## 2022-12-17 PROCEDURE — 93005 ELECTROCARDIOGRAM TRACING: CPT | Performed by: EMERGENCY MEDICINE

## 2022-12-17 PROCEDURE — 70450 CT HEAD/BRAIN W/O DYE: CPT

## 2022-12-17 PROCEDURE — 84443 ASSAY THYROID STIM HORMONE: CPT | Performed by: NURSE PRACTITIONER

## 2022-12-17 PROCEDURE — 85025 COMPLETE CBC W/AUTO DIFF WBC: CPT | Performed by: EMERGENCY MEDICINE

## 2022-12-17 PROCEDURE — 86140 C-REACTIVE PROTEIN: CPT | Performed by: NURSE PRACTITIONER

## 2022-12-17 PROCEDURE — 80053 COMPREHEN METABOLIC PANEL: CPT | Performed by: EMERGENCY MEDICINE

## 2022-12-17 PROCEDURE — 93010 ELECTROCARDIOGRAM REPORT: CPT | Performed by: SPECIALIST

## 2022-12-17 PROCEDURE — 25010000002 PROCHLORPERAZINE 10 MG/2ML SOLUTION: Performed by: NURSE PRACTITIONER

## 2022-12-17 PROCEDURE — 84484 ASSAY OF TROPONIN QUANT: CPT | Performed by: EMERGENCY MEDICINE

## 2022-12-17 PROCEDURE — 85652 RBC SED RATE AUTOMATED: CPT | Performed by: NURSE PRACTITIONER

## 2022-12-17 PROCEDURE — 83735 ASSAY OF MAGNESIUM: CPT | Performed by: NURSE PRACTITIONER

## 2022-12-17 PROCEDURE — 99284 EMERGENCY DEPT VISIT MOD MDM: CPT

## 2022-12-17 PROCEDURE — 96374 THER/PROPH/DIAG INJ IV PUSH: CPT

## 2022-12-17 PROCEDURE — 87636 SARSCOV2 & INF A&B AMP PRB: CPT | Performed by: EMERGENCY MEDICINE

## 2022-12-17 PROCEDURE — 92950 HEART/LUNG RESUSCITATION CPR: CPT

## 2022-12-17 PROCEDURE — 71046 X-RAY EXAM CHEST 2 VIEWS: CPT

## 2022-12-17 PROCEDURE — 36415 COLL VENOUS BLD VENIPUNCTURE: CPT

## 2022-12-17 RX ORDER — PROCHLORPERAZINE EDISYLATE 5 MG/ML
10 INJECTION INTRAMUSCULAR; INTRAVENOUS ONCE
Status: COMPLETED | OUTPATIENT
Start: 2022-12-17 | End: 2022-12-17

## 2022-12-17 RX ORDER — SODIUM CHLORIDE 0.9 % (FLUSH) 0.9 %
10 SYRINGE (ML) INJECTION AS NEEDED
Status: DISCONTINUED | OUTPATIENT
Start: 2022-12-17 | End: 2022-12-18 | Stop reason: HOSPADM

## 2022-12-17 RX ORDER — ASPIRIN 325 MG
325 TABLET ORAL ONCE
Status: DISCONTINUED | OUTPATIENT
Start: 2022-12-17 | End: 2022-12-18 | Stop reason: HOSPADM

## 2022-12-17 RX ADMIN — SODIUM CHLORIDE 1000 ML: 9 INJECTION, SOLUTION INTRAVENOUS at 23:03

## 2022-12-17 RX ADMIN — PROCHLORPERAZINE EDISYLATE 10 MG: 5 INJECTION INTRAMUSCULAR; INTRAVENOUS at 23:02

## 2022-12-18 LAB
QT INTERVAL: 416 MS
QTC INTERVAL: 442 MS

## 2022-12-18 NOTE — ED PROVIDER NOTES
Subjective   History of Present Illness  Patient is a 62-year-old female with significant past medical history positive for arthritis, Crohn's disease, dementia, hypertension, lupus, RA, TIAs presenting to the ER complaints of hypertension and vomiting.  Patient states that she has not been able to hold down her medications is why her blood pressure is elevated tonight.  Patient states that she has been vomiting all day.  Patient is unable to hold down water.  Patient denies chest pain, cough, fever, nausea,  shortness of air or any additional symptoms today.  Patient denies any alleviating or worsening factors.    History provided by:  Patient   used: No        Review of Systems   Constitutional: Negative.  Negative for fever.   HENT: Negative.    Respiratory: Negative.    Cardiovascular: Negative.  Negative for chest pain.   Gastrointestinal: Positive for vomiting. Negative for abdominal pain.   Endocrine: Negative.    Genitourinary: Negative.  Negative for dysuria.   Skin: Negative.    Neurological: Negative.    Psychiatric/Behavioral: Negative.    All other systems reviewed and are negative.      Past Medical History:   Diagnosis Date   • Arthritis    • Crohn disease (HCC)    • Dementia (HCC)    • Gout    • Hypertension    • Kidney stone    • Lupus (HCC)    • RA (rheumatoid arthritis) (HCC)    • TIA (transient ischemic attack)        No Known Allergies    Past Surgical History:   Procedure Laterality Date   • HYSTERECTOMY      Total   • REVISION OF SCAR TISSUE RECTUS MUSCLE         Family History   Problem Relation Age of Onset   • Hypertension Father    • Kidney disease Father    • Cancer Mother    • Cancer Other    • Pancreatic cancer Other    • Kidney disease Other    • Hypertension Other    • Kidney disease Sister        Social History     Socioeconomic History   • Marital status:    Tobacco Use   • Smoking status: Former   • Smokeless tobacco: Never   Substance and Sexual  Activity   • Alcohol use: Yes   • Drug use: Never           Objective   Physical Exam  Vitals and nursing note reviewed.   Constitutional:       General: She is not in acute distress.     Appearance: She is well-developed. She is not diaphoretic.   HENT:      Head: Normocephalic and atraumatic.      Right Ear: External ear normal.      Left Ear: External ear normal.      Nose: Nose normal.   Eyes:      Conjunctiva/sclera: Conjunctivae normal.      Pupils: Pupils are equal, round, and reactive to light.   Neck:      Vascular: No JVD.      Trachea: No tracheal deviation.   Cardiovascular:      Rate and Rhythm: Normal rate and regular rhythm.      Heart sounds: Normal heart sounds. No murmur heard.  Pulmonary:      Effort: Pulmonary effort is normal. No respiratory distress.      Breath sounds: Normal breath sounds. No wheezing.   Abdominal:      General: Bowel sounds are normal.      Palpations: Abdomen is soft.      Tenderness: There is no abdominal tenderness.   Musculoskeletal:         General: No deformity. Normal range of motion.      Cervical back: Normal range of motion and neck supple.   Skin:     General: Skin is warm and dry.      Coloration: Skin is not pale.      Findings: No erythema or rash.   Neurological:      Mental Status: She is alert and oriented to person, place, and time.      Cranial Nerves: No cranial nerve deficit.   Psychiatric:         Behavior: Behavior normal.         Thought Content: Thought content normal.         Procedures           ED Course  ED Course as of 12/18/22 0105   Sat Dec 17, 2022   2252 WBC(!): 15.32 []   Sun Dec 18, 2022   0004 CT Head Without Contrast []   0004 XR Chest 2 View []   0100 ECG 12 Lead ED Triage Standing Order; Chest Pain  Vent. Rate :  68 BPM     Atrial Rate :  68 BPM     P-R Int : 132 ms          QRS Dur :  80 ms      QT Int : 416 ms       P-R-T Axes :  57  64  62 degrees     QTc Int : 442 ms     Normal sinus rhythm  Normal ECG  When compared with ECG  of 24-OCT-2022 21:36,  OH interval has increased [ES]      ED Course User Index  [ES] Kwame Ball MD  [SM] Jackelyn Persaud, APRN                                           MDM  Number of Diagnoses or Management Options  Primary hypertension: established and worsening     Amount and/or Complexity of Data Reviewed  Clinical lab tests: reviewed and ordered  Tests in the radiology section of CPT®: reviewed and ordered  Tests in the medicine section of CPT®: reviewed and ordered  Review and summarize past medical records: yes  Independent visualization of images, tracings, or specimens: yes    Risk of Complications, Morbidity, and/or Mortality  Presenting problems: moderate  Diagnostic procedures: moderate  Management options: moderate    Patient Progress  Patient progress: stable      Final diagnoses:   Primary hypertension       ED Disposition  ED Disposition     ED Disposition   AMA    Condition   --    Comment   --             Lia Nickerson, APRN  [address] [address] [address]  [phone]    Schedule an appointment as soon as possible for a visit in 2 days  As needed         Medication List      No changes were made to your prescriptions during this visit.          Jackelyn Persaud APRN  12/18/22 0012       Jackelyn Persaud APRN  12/18/22 0054       Kwame Ball MD  12/18/22 0105

## 2022-12-18 NOTE — ED NOTES
Patient refused to stay for more testing. Patient stated she felt better. Myself and provider educated patient on the need for more medical testing. Also educated on risks of leaving and benefits for staying

## 2023-04-29 ENCOUNTER — NURSE TRIAGE (OUTPATIENT)
Dept: CALL CENTER | Facility: HOSPITAL | Age: 63
End: 2023-04-29
Payer: COMMERCIAL

## 2023-04-29 NOTE — TELEPHONE ENCOUNTER
Blood pressure has been running high for the past week.     Has monitered b/p throughout the day 177/119 160/134 175/128 161/117 155/120    Currently b/p is 154/100 denies symptoms.    Care advice given per guideline. Reviewed s/s to seek medical assistance.   Reason for Disposition  • Systolic BP  >= 160 OR Diastolic >= 100    Additional Information  • Negative: Difficult to awaken or acting confused (e.g., disoriented, slurred speech)  • Negative: SEVERE difficulty breathing (e.g., struggling for each breath, speaks in single words)  • Negative: [1] Weakness of the face, arm or leg on one side of the body AND [2] new-onset  • Negative: [1] Numbness (i.e., loss of sensation) of the face, arm or leg on one side of the body AND [2] new-onset  • Negative: [1] Chest pain lasts > 5 minutes AND [2] history of heart disease (i.e., heart attack, bypass surgery, angina, angioplasty, CHF)  • Negative: [1] Chest pain AND [2] took nitrogylcerin AND [3] pain was not relieved  • Negative: Sounds like a life-threatening emergency to the triager  • Negative: Symptom is main concern (e.g., headache, chest pain)  • Negative: Low blood pressure is main concern  • Negative: [1] Systolic BP  >= 160 OR Diastolic >= 100 AND [2] cardiac (e.g., breathing difficulty, chest pain) or neurologic symptoms (e.g., new-onset blurred or double vision, unsteady gait)  • Negative: [1] Pregnant 20 or more weeks (or postpartum < 6 weeks) AND [2] new hand or face swelling  • Negative: [1] Pregnant 20 or more weeks (or postpartum < 6 weeks) AND [2] Systolic BP >= 160 OR Diastolic >= 110  • Negative: [1] Systolic BP  >= 200 OR Diastolic >= 120 AND [2] having NO cardiac or neurologic symptoms  • Negative: [1] Pregnant 20 or more weeks (or postpartum < 6 weeks) AND [2] Systolic BP  >= 140 OR Diastolic >= 90  • Negative: [1] Systolic BP  >= 180 OR Diastolic >= 110 AND [2] missed most recent dose of blood pressure medication  • Negative: Systolic BP  >= 180  "OR Diastolic >= 110  • Negative: Ran out of BP medications    Answer Assessment - Initial Assessment Questions  1. BLOOD PRESSURE: \"What is the blood pressure?\" \"Did you take at least two measurements 5 minutes apart?\"  Has taken blood pressure throughout the day  2. ONSET: \"When did you take your blood pressure?\"   throughout the day, has been high for the last week  3. HOW: \"How did you take your blood pressure?\" (e.g., automatic home BP monitor, visiting nurse)  Automatic cuff  4. HISTORY: \"Do you have a history of high blood pressure?\"    yes  5. MEDICINES: \"Are you taking any medicines for blood pressure?\" \"Have you missed any doses recently?\"      (MAXZIDE) 75-50 MG  Daily, has missed no doses  6. OTHER SYMPTOMS: \"Do you have any symptoms?\" (e.g., blurred vision, chest pain, difficulty breathing, headache, weakness)      Chest tightness felt heart was beating hard earlier today but not now.  7. PREGNANCY: \"Is there any chance you are pregnant?\" \"When was your last menstrual period?\"  na    Protocols used: BLOOD PRESSURE - HIGH-ADULT-AH      "

## 2023-05-01 ENCOUNTER — HOSPITAL ENCOUNTER (EMERGENCY)
Facility: HOSPITAL | Age: 63
Discharge: HOME | End: 2023-05-01
Attending: STUDENT IN AN ORGANIZED HEALTH CARE EDUCATION/TRAINING PROGRAM
Payer: COMMERCIAL

## 2023-05-01 VITALS
HEIGHT: 62 IN | HEART RATE: 93 BPM | OXYGEN SATURATION: 98 % | TEMPERATURE: 97.6 F | RESPIRATION RATE: 20 BRPM | BODY MASS INDEX: 33.13 KG/M2 | SYSTOLIC BLOOD PRESSURE: 128 MMHG | DIASTOLIC BLOOD PRESSURE: 99 MMHG | WEIGHT: 180 LBS

## 2023-05-01 DIAGNOSIS — I10 HYPERTENSION, UNSPECIFIED TYPE: Primary | ICD-10-CM

## 2023-05-01 LAB
ALBUMIN SERPL-MCNC: 4.5 G/DL (ref 3.5–5.2)
ALBUMIN/GLOB SERPL: 1.6 G/DL
ALP SERPL-CCNC: 94 U/L (ref 39–117)
ALT SERPL W P-5'-P-CCNC: 32 U/L (ref 1–33)
ANION GAP SERPL CALCULATED.3IONS-SCNC: 11.2 MMOL/L (ref 5–15)
APTT PPP: 32.4 SECONDS (ref 26.5–34.5)
AST SERPL-CCNC: 20 U/L (ref 1–32)
BASOPHILS # BLD AUTO: 0.19 10*3/MM3 (ref 0–0.2)
BASOPHILS NFR BLD AUTO: 2 % (ref 0–1.5)
BILIRUB SERPL-MCNC: 0.3 MG/DL (ref 0–1.2)
BUN SERPL-MCNC: 17 MG/DL (ref 8–23)
BUN/CREAT SERPL: 13.9 (ref 7–25)
CALCIUM SPEC-SCNC: 10.8 MG/DL (ref 8.6–10.5)
CHLORIDE SERPL-SCNC: 95 MMOL/L (ref 98–107)
CO2 SERPL-SCNC: 29.8 MMOL/L (ref 22–29)
CREAT SERPL-MCNC: 1.22 MG/DL (ref 0.57–1)
DEPRECATED RDW RBC AUTO: 47.1 FL (ref 37–54)
EGFRCR SERPLBLD CKD-EPI 2021: 50.3 ML/MIN/1.73
EOSINOPHIL # BLD AUTO: 0.27 10*3/MM3 (ref 0–0.4)
EOSINOPHIL NFR BLD AUTO: 2.9 % (ref 0.3–6.2)
ERYTHROCYTE [DISTWIDTH] IN BLOOD BY AUTOMATED COUNT: 13.5 % (ref 12.3–15.4)
GEN 5 2HR TROPONIN T REFLEX: 12 NG/L
GLOBULIN UR ELPH-MCNC: 2.9 GM/DL
GLUCOSE SERPL-MCNC: 94 MG/DL (ref 65–99)
HCT VFR BLD AUTO: 45.4 % (ref 34–46.6)
HGB BLD-MCNC: 15.6 G/DL (ref 12–15.9)
IMM GRANULOCYTES # BLD AUTO: 0.08 10*3/MM3 (ref 0–0.05)
IMM GRANULOCYTES NFR BLD AUTO: 0.8 % (ref 0–0.5)
INR PPP: 0.9 (ref 0.9–1.1)
LYMPHOCYTES # BLD AUTO: 2.19 10*3/MM3 (ref 0.7–3.1)
LYMPHOCYTES NFR BLD AUTO: 23.2 % (ref 19.6–45.3)
MCH RBC QN AUTO: 32.6 PG (ref 26.6–33)
MCHC RBC AUTO-ENTMCNC: 34.4 G/DL (ref 31.5–35.7)
MCV RBC AUTO: 95 FL (ref 79–97)
MONOCYTES # BLD AUTO: 1.27 10*3/MM3 (ref 0.1–0.9)
MONOCYTES NFR BLD AUTO: 13.4 % (ref 5–12)
NEUTROPHILS NFR BLD AUTO: 5.46 10*3/MM3 (ref 1.7–7)
NEUTROPHILS NFR BLD AUTO: 57.7 % (ref 42.7–76)
NRBC BLD AUTO-RTO: 0 /100 WBC (ref 0–0.2)
PLATELET # BLD AUTO: 376 10*3/MM3 (ref 140–450)
PMV BLD AUTO: 9.3 FL (ref 6–12)
POTASSIUM SERPL-SCNC: 3.3 MMOL/L (ref 3.5–5.2)
PROT SERPL-MCNC: 7.4 G/DL (ref 6–8.5)
PROTHROMBIN TIME: 12.6 SECONDS (ref 12.1–14.7)
QT INTERVAL: 378 MS
QTC INTERVAL: 459 MS
RBC # BLD AUTO: 4.78 10*6/MM3 (ref 3.77–5.28)
SODIUM SERPL-SCNC: 136 MMOL/L (ref 136–145)
TROPONIN T DELTA: -1 NG/L
TROPONIN T SERPL HS-MCNC: 13 NG/L
WBC NRBC COR # BLD: 9.46 10*3/MM3 (ref 3.4–10.8)

## 2023-05-01 PROCEDURE — 85025 COMPLETE CBC W/AUTO DIFF WBC: CPT | Performed by: NURSE PRACTITIONER

## 2023-05-01 PROCEDURE — 36415 COLL VENOUS BLD VENIPUNCTURE: CPT

## 2023-05-01 PROCEDURE — 80053 COMPREHEN METABOLIC PANEL: CPT | Performed by: NURSE PRACTITIONER

## 2023-05-01 PROCEDURE — 93010 ELECTROCARDIOGRAM REPORT: CPT | Performed by: INTERNAL MEDICINE

## 2023-05-01 PROCEDURE — 85610 PROTHROMBIN TIME: CPT | Performed by: NURSE PRACTITIONER

## 2023-05-01 PROCEDURE — 85730 THROMBOPLASTIN TIME PARTIAL: CPT | Performed by: NURSE PRACTITIONER

## 2023-05-01 PROCEDURE — 99283 EMERGENCY DEPT VISIT LOW MDM: CPT

## 2023-05-01 PROCEDURE — 93005 ELECTROCARDIOGRAM TRACING: CPT | Performed by: NURSE PRACTITIONER

## 2023-05-01 PROCEDURE — 84484 ASSAY OF TROPONIN QUANT: CPT | Performed by: NURSE PRACTITIONER

## 2023-05-01 RX ORDER — CLONIDINE HYDROCHLORIDE 0.1 MG/1
0.1 TABLET ORAL 2 TIMES DAILY PRN
Qty: 10 TABLET | Refills: 0 | Status: SHIPPED | OUTPATIENT
Start: 2023-05-01

## 2023-05-01 RX ORDER — SODIUM CHLORIDE 0.9 % (FLUSH) 0.9 %
10 SYRINGE (ML) INJECTION AS NEEDED
Status: DISCONTINUED | OUTPATIENT
Start: 2023-05-01 | End: 2023-05-01 | Stop reason: HOSPADM

## 2023-05-01 NOTE — ED PROVIDER NOTES
Subjective   History of Present Illness  Patient is a 62-year-old female presents emergency room today for complaint of transient hypotension.  Patient reports she is also had off-and-on headaches.  Patient reports she takes blood pressure medication that she been taking for 9 years feels does not work as much anymore.  Patient reports her Dr. Bagley and with stasis come to the ER.  Patient denies any chest pain or shortness of breath.  Patient denies any worsening factors.  Patient denies any alleviating factors other than her normal medicine.  Patient denies trying any interventions besides her new medication.  Patient reports this is helped some.    Hypertension      Review of Systems   Constitutional: Negative.    HENT: Negative.    Eyes: Negative.    Respiratory: Negative.    Cardiovascular: Negative.    Gastrointestinal: Negative.    Endocrine: Negative.    Genitourinary: Negative.    Musculoskeletal: Negative.    Skin: Negative.    Allergic/Immunologic: Negative.    Neurological: Negative.    Hematological: Negative.    Psychiatric/Behavioral: Negative.        Past Medical History:   Diagnosis Date   • Arthritis    • Crohn disease    • Dementia    • Gout    • Hypertension    • Kidney stone    • Lupus    • RA (rheumatoid arthritis)    • TIA (transient ischemic attack)        No Known Allergies    Past Surgical History:   Procedure Laterality Date   • HYSTERECTOMY      Total   • REVISION OF SCAR TISSUE RECTUS MUSCLE         Family History   Problem Relation Age of Onset   • Hypertension Father    • Kidney disease Father    • Cancer Mother    • Cancer Other    • Pancreatic cancer Other    • Kidney disease Other    • Hypertension Other    • Kidney disease Sister        Social History     Socioeconomic History   • Marital status:    Tobacco Use   • Smoking status: Former   • Smokeless tobacco: Never   Substance and Sexual Activity   • Alcohol use: Yes   • Drug use: Never           Objective   Physical  Exam  Vitals and nursing note reviewed.   Constitutional:       Appearance: She is well-developed.   HENT:      Head: Normocephalic.      Right Ear: External ear normal.      Left Ear: External ear normal.   Eyes:      Conjunctiva/sclera: Conjunctivae normal.      Pupils: Pupils are equal, round, and reactive to light.   Cardiovascular:      Rate and Rhythm: Normal rate and regular rhythm.      Heart sounds: Normal heart sounds.   Pulmonary:      Effort: Pulmonary effort is normal.      Breath sounds: Normal breath sounds.   Abdominal:      General: Bowel sounds are normal.      Palpations: Abdomen is soft.   Musculoskeletal:         General: Normal range of motion.      Cervical back: Normal range of motion and neck supple.   Skin:     General: Skin is warm and dry.      Capillary Refill: Capillary refill takes less than 2 seconds.   Neurological:      Mental Status: She is alert and oriented to person, place, and time.   Psychiatric:         Behavior: Behavior normal.         Thought Content: Thought content normal.         Procedures    Results for orders placed or performed during the hospital encounter of 05/01/23   Comprehensive Metabolic Panel    Specimen: Blood   Result Value Ref Range    Glucose 94 65 - 99 mg/dL    BUN 17 8 - 23 mg/dL    Creatinine 1.22 (H) 0.57 - 1.00 mg/dL    Sodium 136 136 - 145 mmol/L    Potassium 3.3 (L) 3.5 - 5.2 mmol/L    Chloride 95 (L) 98 - 107 mmol/L    CO2 29.8 (H) 22.0 - 29.0 mmol/L    Calcium 10.8 (H) 8.6 - 10.5 mg/dL    Total Protein 7.4 6.0 - 8.5 g/dL    Albumin 4.5 3.5 - 5.2 g/dL    ALT (SGPT) 32 1 - 33 U/L    AST (SGOT) 20 1 - 32 U/L    Alkaline Phosphatase 94 39 - 117 U/L    Total Bilirubin 0.3 0.0 - 1.2 mg/dL    Globulin 2.9 gm/dL    A/G Ratio 1.6 g/dL    BUN/Creatinine Ratio 13.9 7.0 - 25.0    Anion Gap 11.2 5.0 - 15.0 mmol/L    eGFR 50.3 (L) >60.0 mL/min/1.73   aPTT    Specimen: Blood   Result Value Ref Range    PTT 32.4 26.5 - 34.5 seconds   Protime-INR    Specimen:  Blood   Result Value Ref Range    Protime 12.6 12.1 - 14.7 Seconds    INR 0.90 0.90 - 1.10   High Sensitivity Troponin T    Specimen: Blood   Result Value Ref Range    HS Troponin T 13 (H) <10 ng/L   CBC Auto Differential    Specimen: Blood   Result Value Ref Range    WBC 9.46 3.40 - 10.80 10*3/mm3    RBC 4.78 3.77 - 5.28 10*6/mm3    Hemoglobin 15.6 12.0 - 15.9 g/dL    Hematocrit 45.4 34.0 - 46.6 %    MCV 95.0 79.0 - 97.0 fL    MCH 32.6 26.6 - 33.0 pg    MCHC 34.4 31.5 - 35.7 g/dL    RDW 13.5 12.3 - 15.4 %    RDW-SD 47.1 37.0 - 54.0 fl    MPV 9.3 6.0 - 12.0 fL    Platelets 376 140 - 450 10*3/mm3    Neutrophil % 57.7 42.7 - 76.0 %    Lymphocyte % 23.2 19.6 - 45.3 %    Monocyte % 13.4 (H) 5.0 - 12.0 %    Eosinophil % 2.9 0.3 - 6.2 %    Basophil % 2.0 (H) 0.0 - 1.5 %    Immature Grans % 0.8 (H) 0.0 - 0.5 %    Neutrophils, Absolute 5.46 1.70 - 7.00 10*3/mm3    Lymphocytes, Absolute 2.19 0.70 - 3.10 10*3/mm3    Monocytes, Absolute 1.27 (H) 0.10 - 0.90 10*3/mm3    Eosinophils, Absolute 0.27 0.00 - 0.40 10*3/mm3    Basophils, Absolute 0.19 0.00 - 0.20 10*3/mm3    Immature Grans, Absolute 0.08 (H) 0.00 - 0.05 10*3/mm3    nRBC 0.0 0.0 - 0.2 /100 WBC   High Sensitivity Troponin T 2Hr    Specimen: Arm, Left; Blood   Result Value Ref Range    HS Troponin T 12 (H) <10 ng/L    Troponin T Delta -1 >=-4 - <+4 ng/L   ECG 12 Lead Tachycardia   Result Value Ref Range    QT Interval 378 ms    QTC Interval 459 ms     No orders to display              .ED Course  ED Course as of 05/01/23 1429   Mon May 01, 2023   1051 ECG 12 Lead Tachycardia  Sinus rhythm ventricular rate 89  QRS 76 QTc 459  Electronically signed by Zeina Rosen DO, 05/01/23, 10:51 AM EDT.   [LK]      ED Course User Index  [LK] Zeina Rosen DO                                           Medical Decision Making  Patient is a 62-year-old female presents emergency room today for complaint of transient hypotension.  Patient reports she is also had off-and-on  headaches.  Patient reports she takes blood pressure medication that she been taking for 9 years feels does not work as much anymore.  Patient reports her Dr. Bagley and with stasis come to the ER.  Patient denies any chest pain or shortness of breath.  Patient denies any worsening factors.  Patient denies any alleviating factors other than her normal medicine.  Patient denies trying any interventions besides her new medication.  Patient reports this is helped some.    Hypertension, unspecified type: acute illness or injury  Amount and/or Complexity of Data Reviewed  Labs: ordered. Decision-making details documented in ED Course.  ECG/medicine tests: ordered. Decision-making details documented in ED Course.      Risk  Prescription drug management.          Final diagnoses:   Hypertension, unspecified type       ED Disposition  ED Disposition     ED Disposition   Discharge    Condition   Stable    Comment   --             Lia Nickerson, APRN  [id number] [facility]  [address] [id number] [phone]    Schedule an appointment as soon as possible for a visit   For further evaluation         Medication List      New Prescriptions    cloNIDine 0.1 MG tablet  Commonly known as: CATAPRES  Take 1 tablet by mouth 2 (Two) Times a Day As Needed for High Blood Pressure (BP greater than 160/110).           Where to Get Your Medications      You can get these medications from any pharmacy    Bring a paper prescription for each of these medications  · cloNIDine 0.1 MG tablet        (H) <10 ng/L    Troponin T Delta -1 >=-4 - <+4 ng/L   ECG 12 Lead Tachycardia   Result Value Ref Range    QT Interval 378 ms    QTC Interval 459 ms                                       Medical Decision Making  Patient is a 62-year-old female presents emergency room today for complaint of transient hypotension.  Patient reports she is also had off-and-on headaches.  Patient reports she takes blood pressure medication that she been taking for 9 years feels does not work as much anymore.  Patient reports her Dr. Bagley and with stasis come to the ER.  Patient denies any chest pain or shortness of breath.  Patient denies any worsening factors.  Patient denies any alleviating factors other than her normal medicine.  Patient denies trying any interventions besides her new medication.  Patient reports this is helped some.    Hypertension, unspecified type: acute illness or injury  Amount and/or Complexity of Data Reviewed  Labs: ordered. Decision-making details documented in ED Course.  ECG/medicine tests: ordered. Decision-making details documented in ED Course.      Risk  Prescription drug management.          Final diagnoses:   Hypertension, unspecified type       ED Disposition  ED Disposition     ED Disposition   Discharge    Condition   Stable    Comment   --             Lia Nickerson, APRN  [id number]  [address]  [facility] [address]  [place] [address]  [phone]    Schedule an appointment as soon as possible for a visit   For further evaluation         Medication List      New Prescriptions    cloNIDine 0.1 MG tablet  Commonly known as: CATAPRES  Take 1 tablet by mouth 2 (Two) Times a Day As Needed for High Blood Pressure (BP greater than 160/110).           Where to Get Your Medications      These medications were sent to [facility] [place]  [address] [place] [address] [place] [address]    Hours: 8AM-6PM Mon-Fri Phone: [phone]   · cloNIDine 0.1 MG tablet          Edilson Stanton, MANDY  05/25/23 6603

## 2023-05-02 ENCOUNTER — TRANSCRIBE ORDERS (OUTPATIENT)
Dept: ADMINISTRATIVE | Facility: HOSPITAL | Age: 63
End: 2023-05-02
Payer: COMMERCIAL

## 2023-05-11 ENCOUNTER — HOSPITAL ENCOUNTER (OUTPATIENT)
Dept: CT IMAGING | Facility: HOSPITAL | Age: 63
Discharge: HOME | End: 2023-05-11
Payer: COMMERCIAL

## 2023-05-11 ENCOUNTER — TRANSCRIBE ORDERS (OUTPATIENT)
Dept: PHYSICAL THERAPY | Facility: CLINIC | Age: 63
End: 2023-05-11
Payer: COMMERCIAL

## 2023-05-11 DIAGNOSIS — R10.9 STOMACH ACHE: ICD-10-CM

## 2023-05-11 DIAGNOSIS — M15.9 GENERALIZED OSTEOARTHROSIS, INVOLVING MULTIPLE SITES: Primary | ICD-10-CM

## 2023-05-12 ENCOUNTER — TREATMENT (OUTPATIENT)
Dept: PHYSICAL THERAPY | Facility: CLINIC | Age: 63
End: 2023-05-12
Payer: COMMERCIAL

## 2023-05-12 DIAGNOSIS — M19.90 OSTEOARTHRITIS, UNSPECIFIED OSTEOARTHRITIS TYPE, UNSPECIFIED SITE: Primary | ICD-10-CM

## 2023-05-12 DIAGNOSIS — M79.642 LEFT HAND PAIN: ICD-10-CM

## 2023-05-12 DIAGNOSIS — M79.641 RIGHT HAND PAIN: ICD-10-CM

## 2023-05-12 NOTE — PROGRESS NOTES
____________________________________________________________________      Outpatient Rehabilitation  [address] [facility]  [place] [address]  Phone: [phone] / Fax: [phone]       Occupational Therapy - Hand    Initial Evaluation and Plan of Care           Patient: Gertrudis Moore   : 1960  Diagnosis/ICD-10 Code:  Osteoarthritis, unspecified osteoarthritis type, unspecified site [M19.90]  Referring practitioner: Abbey Lugo MD  Date of Initial Visit: 2023  Today's Date: 2023  Patient seen for 1 session         Visit Diagnoses:    ICD-10-CM ICD-9-CM   1. Osteoarthritis, unspecified osteoarthritis type, unspecified site  M19.90 715.90   2. Right hand pain  M79.641 729.5   3. Left hand pain  M79.642 729.5           Subjective Evaluation    History of Present Illness  Mechanism of injury: Pt referring to OT due to bilateral hand OA. Pt reports increased hand pain which varies day to day and task to task. Pt report no formal therapy to address hands but past PT for LE and back issues. Pt reports no hand HEP. Pt reports that she is primarily interested in orthotic use at this time and no additional OT treatment.     Pt reports medical history negative for diabetes; positive for Lupus and RA.     Pain  Current pain ratin  At best pain ratin  At worst pain rating: 10  Quality: throbbing  Relieving factors: rest and change in position    Social Support  Lives in: apartment  Lives with: alone    Hand dominance: right    Treatments  Current treatment: medication           Objective          Active Range of Motion   Left Shoulder   Normal active range of motion    Right Shoulder   Normal active range of motion    Left Elbow   Normal active range of motion    Right Elbow   Normal active range of motion    Left Wrist   Normal active range of motion    Right Wrist   Normal active range of motion    Left Thumb   Radial abduction    CMC: 45 degrees    Right Thumb   Radial Abduction     CMC: 30 degrees    Strength/Myotome Testing     Left Shoulder   Normal muscle strength    Right Shoulder   Normal muscle strength    Left Elbow   Normal strength    Right Elbow   Normal strength    Left Wrist/Hand   Normal wrist strength     (2nd hand position)     Trial 1: 50 lbs    Trial 2: 60 lbs    Trial 3: 60 lbs    Average: 56.67 lbs    Right Wrist/Hand   Normal wrist strength     (2nd hand position)     Trial 1: 61 lbs    Trial 2: 55 lbs    Trial 3: 61 lbs    Average: 59 lbs    Additional Strength Details   Strength Norms:    Right= 55.1    Left= 45.7    Tests     Left Wrist/Hand   Negative CMC shoulder sign.     Right Wrist/Hand   Negative CMC shoulder sign.     Functional Assessment     Comments  QuickDASH= TBD          Assessment & Plan     Assessment  Impairments: lacks appropriate home exercise program and pain with function    Assessment details: Pt presents to OT/CHT with c/o intermittent bilateral thumb pain that she is due to arthritis. Pt reports that she has been offer the suggestion of possible injection but that she has not had the best experience from other injection and therefore was not interested. Pt reports no prior therapy treat for this problem and not interested in treatment at this time. Pt states he mainly just wants to try a thumb orthotic to see if that helps. Pt reports no HEP. Pt will benefit from OT/CHT treatment to establish proper HEP and patient awareness as well as bilateral thumb orthotics. OT to treat for 2 visits to establish HEP, patient education, and thumb orthotic fitting/fabrication to improve bilateral hand pain and optimize comfort and independence. Of note, at time of evaluation, physician orthotic order has  and update order pending.  Prognosis: good  Prognosis details: Treatment: OT provided pt education on basic thumb  including role of Adduction pollicis as well as 1st dorsal interosseous. Pt provided with HEP to improve thumb pain as  well as education on actions/positions to avoid and way to modified tasks to increase joint protection. Pt with a good verbal return.      Goals  Plan Goals: To improve independence and comfort: (4 weeks)    1. Demonstrate good return on HEP  2. Demonstrate good understanding, tolerance, and fitting of bilateral thumb orthotics.    Plan  Planned therapy interventions: body mechanics training, functional ROM exercises, home exercise program, orthotic fitting/training and therapeutic activities  Frequency: 1x month  Duration in visits: 2  Plan details: OT to treat for 2 visits to establish HEP, patient education, and thumb orthotic fitting/fabrication to improve bilateral hand pain and optimize comfort and independence.           History # of Personal Factors and/or Comorbidities: MODERATE (1-2)  Examination of Body System(s): # of elements: LOW (1-2)  Clinical Presentation: STABLE   Clinical Decision Making: LOW       Timed:         Manual Therapy:    0     mins  39201;     Therapeutic Exercise:    15     mins  79910;     Neuromuscular Cintia:    0    mins  72839;    Therapeutic Activity:     29     mins  36113;     Ultrasound:     0     mins  61460;    Ionto                               0    mins   28160  Self Care                       0     mins   97067  Electrical Stimulation:    0     mins  98725    Un-Timed:  Electrical Stimulation:    0     mins  96204 ( );    Low Eval     20     Mins  52839  Mod Eval     0     Mins  68303  High Eval                       0     Mins  02306        Timed Treatment:   44 mins   Total Treatment:     64   mins          OT SIGNATURE:       Occupational Therapist, Certified Hand Therapist    [place] License #[id number]  NPI #[id number]  Electronically signed by: Kenny D. Maynes, JEMIMA/L, CHT, CKTP, JOSE 5/12/2023        Certification Period: 5/12/2023 thru 8/9/2023    I certify that the therapy services are furnished while this patient is under my care.  The services outlined above are  required by this patient, and will be reviewed every 90 days.         Physician Signature:__________________________________________________    PHYSICIAN: Abbey Lugo MD  NPI: [id number]                                      DATE:      Please sign and return via fax to [phone] . Thank you, [facility].

## 2023-05-16 ENCOUNTER — TRANSCRIBE ORDERS (OUTPATIENT)
Dept: ADMINISTRATIVE | Facility: HOSPITAL | Age: 63
End: 2023-05-16
Payer: COMMERCIAL

## 2023-05-16 DIAGNOSIS — Z12.31 ENCOUNTER FOR SCREENING MAMMOGRAM FOR MALIGNANT NEOPLASM OF BREAST: Primary | ICD-10-CM

## 2023-05-22 ENCOUNTER — TELEPHONE (OUTPATIENT)
Dept: PHYSICAL THERAPY | Facility: CLINIC | Age: 63
End: 2023-05-22
Payer: COMMERCIAL

## 2023-05-23 ENCOUNTER — TELEPHONE (OUTPATIENT)
Dept: PHYSICAL THERAPY | Facility: CLINIC | Age: 63
End: 2023-05-23
Payer: COMMERCIAL

## 2023-07-09 PROBLEM — N28.89 RENAL MASS: Status: ACTIVE | Noted: 2023-07-09

## 2023-07-27 ENCOUNTER — HOSPITAL ENCOUNTER (OUTPATIENT)
Dept: MRI IMAGING | Facility: HOSPITAL | Age: 63
Discharge: HOME | End: 2023-07-27
Admitting: UROLOGY
Payer: COMMERCIAL

## 2023-07-27 DIAGNOSIS — N28.1 RENAL CYST: ICD-10-CM

## 2023-07-27 PROCEDURE — 74181 MRI ABDOMEN W/O CONTRAST: CPT

## 2023-07-27 PROCEDURE — 74181 MRI ABDOMEN W/O CONTRAST: CPT | Performed by: RADIOLOGY

## 2023-08-08 ENCOUNTER — TELEPHONE (OUTPATIENT)
Dept: UROLOGY | Facility: CLINIC | Age: 63
End: 2023-08-08
Payer: COMMERCIAL

## 2023-08-10 NOTE — TELEPHONE ENCOUNTER
Spoke with patient to schedule a 6 month follow up. Patient was not at home and wanted to call back to schedule appointment.

## 2023-09-05 ENCOUNTER — TELEPHONE (OUTPATIENT)
Dept: UROLOGY | Facility: CLINIC | Age: 63
End: 2023-09-05
Payer: COMMERCIAL

## 2023-09-05 NOTE — TELEPHONE ENCOUNTER
I called and left a vm with the pt about her MRI    ----- Message from Lopez Michaels MD sent at 9/5/2023  6:30 AM EDT -----  Notify good reading we will recheck her in 6 months  ----- Message -----  From: [email or web address]  Sent: 7/27/2023  11:21 AM EDT  To: Lopez Michaels MD